# Patient Record
Sex: FEMALE | Race: OTHER | HISPANIC OR LATINO | Employment: OTHER | URBAN - METROPOLITAN AREA
[De-identification: names, ages, dates, MRNs, and addresses within clinical notes are randomized per-mention and may not be internally consistent; named-entity substitution may affect disease eponyms.]

---

## 2021-03-03 DIAGNOSIS — Z23 NEED FOR VACCINATION: ICD-10-CM

## 2023-06-15 ENCOUNTER — HOSPITAL ENCOUNTER (OUTPATIENT)
Dept: HEMATOLOGY ONCOLOGY | Facility: MEDICAL CENTER | Age: 68
End: 2023-06-15
Attending: INTERNAL MEDICINE
Payer: COMMERCIAL

## 2023-06-15 VITALS
HEIGHT: 62 IN | RESPIRATION RATE: 15 BRPM | TEMPERATURE: 98.6 F | BODY MASS INDEX: 24.87 KG/M2 | WEIGHT: 135.14 LBS | DIASTOLIC BLOOD PRESSURE: 78 MMHG | OXYGEN SATURATION: 94 % | HEART RATE: 72 BPM | SYSTOLIC BLOOD PRESSURE: 138 MMHG

## 2023-06-15 DIAGNOSIS — I10 PRIMARY HYPERTENSION: ICD-10-CM

## 2023-06-15 DIAGNOSIS — D05.12 DUCTAL CARCINOMA IN SITU (DCIS) OF LEFT BREAST: ICD-10-CM

## 2023-06-15 PROCEDURE — 99204 OFFICE O/P NEW MOD 45 MIN: CPT | Performed by: INTERNAL MEDICINE

## 2023-06-15 RX ORDER — ATENOLOL 100 MG/1
TABLET ORAL
COMMUNITY
Start: 2023-05-10 | End: 2023-08-30 | Stop reason: SDUPTHER

## 2023-06-15 RX ORDER — CHLORAL HYDRATE 500 MG
1000 CAPSULE ORAL DAILY
COMMUNITY

## 2023-06-15 RX ORDER — LETROZOLE 2.5 MG/1
TABLET, FILM COATED ORAL
COMMUNITY
Start: 2023-05-10 | End: 2023-08-14 | Stop reason: SDUPTHER

## 2023-06-15 ASSESSMENT — ENCOUNTER SYMPTOMS
MEMORY LOSS: 0
DEPRESSION: 0
TREMORS: 0
WEIGHT LOSS: 0
PALPITATIONS: 0
HEADACHES: 0
MYALGIAS: 0
DIZZINESS: 0
COUGH: 0
SPUTUM PRODUCTION: 0
NECK PAIN: 0
FOCAL WEAKNESS: 0
CHILLS: 0
WHEEZING: 0
VOMITING: 0
SHORTNESS OF BREATH: 0
FEVER: 0
BRUISES/BLEEDS EASILY: 0
SENSORY CHANGE: 0
TINGLING: 0
ORTHOPNEA: 0
BLURRED VISION: 0
NAUSEA: 0
ABDOMINAL PAIN: 0
SORE THROAT: 0
HEARTBURN: 0
BACK PAIN: 0

## 2023-06-15 NOTE — PROGRESS NOTES
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 female with a remote history of right DCIS and a more recent history of left-sided DCIS treated with partial mastectomy and radiation in 2020.  She saw Dr. Shoemaker in [place] who started her on letrozole at that time.  She is tolerating it very well with no hot flashes night sweats or musculoskeletal symptoms.  She had a recent bone density test that showed normal bone density.  Her last mammogram was in March 2022.  She has no problems with her breast now.  Other than hypertension her health is good.      History reviewed. No pertinent past medical history.    History reviewed. No pertinent surgical history.    Social History     Tobacco Use    Smoking status: Never    Smokeless tobacco: Never   Vaping Use    Vaping Use: Never used   Substance Use Topics    Alcohol use: Yes     Comment: Very seldom        History reviewed. No pertinent family history.    Allergies as of 06/15/2023 - Reviewed 06/15/2023   Allergen Reaction Noted    Hydrocodone Vomiting and Nausea 08/19/2019    Codeine Unspecified 06/15/2023    Latex Rash 06/15/2023    Septra [sulfamethoxazole w-trimethoprim] Hives 06/15/2023         Current Outpatient Medications:     letrozole (FEMARA) 2.5 MG Tab, , Disp: , Rfl:     atenolol (TENORMIN) 100 MG Tab, , Disp: , Rfl:     Omega-3 Fatty Acids (FISH OIL) 1000 MG Cap capsule, Take 1,000 mg by mouth every day., Disp: , Rfl:     cyanocobalamin (VITAMIN B12) 1000 MCG Tab, Take 1,000 mcg by mouth every day., Disp: , Rfl:     Calcium-Magnesium-Vitamin D (CALCIUM MAGNESIUM PO), Take  by mouth., Disp: , Rfl:     Multiple Vitamin (MULTIVITAMIN ADULT PO), Take  by mouth., Disp: , Rfl:     Review of Systems:  Review of Systems  "  Constitutional:  Negative for chills, fever, malaise/fatigue and weight loss.   HENT:  Negative for congestion, ear pain, nosebleeds and sore throat.    Eyes:  Negative for blurred vision.   Respiratory:  Negative for cough, sputum production, shortness of breath and wheezing.    Cardiovascular:  Negative for chest pain, palpitations, orthopnea and leg swelling.   Gastrointestinal:  Negative for abdominal pain, heartburn, nausea and vomiting.   Genitourinary:  Negative for dysuria, frequency and urgency.   Musculoskeletal:  Negative for back pain, joint pain, myalgias and neck pain.   Neurological:  Negative for dizziness, tingling, tremors, sensory change, focal weakness and headaches.   Endo/Heme/Allergies:  Does not bruise/bleed easily.   Psychiatric/Behavioral:  Negative for depression, memory loss and suicidal ideas.    All other systems reviewed and are negative.         Physical Exam:  Vitals:    06/15/23 1605   BP: 138/78   BP Location: Right arm   Patient Position: Sitting   Pulse: 72   Resp: 15   Temp: 37 °C (98.6 °F)   TempSrc: Temporal   SpO2: 94%   Weight: 61.3 kg (135 lb 2.3 oz)   Height: 1.568 m (5' 1.75\")       DESC; KARNOFSKY SCALE WITH ECOG EQUIVALENT: 100, Fully active, able to carry on all pre-disease performed without restriction (ECOG equivalent 0)    DISTRESS LEVEL: no acute distress    Physical Exam  Constitutional:       Appearance: Normal appearance.   HENT:      Head: Normocephalic.      Mouth/Throat:      Mouth: Mucous membranes are moist.      Pharynx: Oropharynx is clear.   Eyes:      Extraocular Movements: Extraocular movements intact.      Conjunctiva/sclera: Conjunctivae normal.      Pupils: Pupils are equal, round, and reactive to light.   Cardiovascular:      Rate and Rhythm: Normal rate and regular rhythm.      Pulses: Normal pulses.   Pulmonary:      Effort: Pulmonary effort is normal.      Breath sounds: Normal breath sounds.   Chest:      Comments: Both breasts are without " masses nipple discharge or tenderness.  Abdominal:      General: Abdomen is flat. Bowel sounds are normal.      Palpations: Abdomen is soft. There is no mass.   Musculoskeletal:         General: Normal range of motion.   Skin:     General: Skin is warm.   Neurological:      General: No focal deficit present.      Mental Status: She is alert and oriented to person, place, and time.   Psychiatric:         Mood and Affect: Mood normal.         Behavior: Behavior normal.              Labs:  No visits with results within 1 Month(s) from this visit.   Latest known visit with results is:   No results found for any previous visit.       Imaging:   All listed images below have been independently reviewed by me. I agree with the findings as summarized below:    No results found.     Pathology:      Assessment & Plan:  history of bilateral DCIS status postlumpectomy and radiation therapy on chemoprophylaxis with letrozole since 2020.    Plan: Baseline yearly mammogram to be ordered.  I will see her back in 6 months for routine follow-up .  We plan to continue letrozole for 2 more years.      Any questions and concerns raised by the patient were answered to the best of my ability. Thank you for allowing me to participate in the care for this patient. Please feel free to contact me for any questions or concerns.     Victorino Keys M.D.

## 2023-06-20 ENCOUNTER — TELEPHONE (OUTPATIENT)
Dept: HEALTH INFORMATION MANAGEMENT | Facility: OTHER | Age: 68
End: 2023-06-20
Payer: COMMERCIAL

## 2023-06-30 ENCOUNTER — HOSPITAL ENCOUNTER (OUTPATIENT)
Dept: RADIOLOGY | Facility: MEDICAL CENTER | Age: 68
End: 2023-06-30
Payer: COMMERCIAL

## 2023-07-03 ENCOUNTER — HOSPITAL ENCOUNTER (OUTPATIENT)
Dept: RADIOLOGY | Facility: MEDICAL CENTER | Age: 68
End: 2023-07-03
Attending: INTERNAL MEDICINE
Payer: COMMERCIAL

## 2023-07-03 DIAGNOSIS — I10 PRIMARY HYPERTENSION: ICD-10-CM

## 2023-07-03 DIAGNOSIS — D05.12 DUCTAL CARCINOMA IN SITU (DCIS) OF LEFT BREAST: ICD-10-CM

## 2023-07-03 PROCEDURE — G0279 TOMOSYNTHESIS, MAMMO: HCPCS

## 2023-07-26 ENCOUNTER — TELEPHONE (OUTPATIENT)
Dept: HEALTH INFORMATION MANAGEMENT | Facility: OTHER | Age: 68
End: 2023-07-26
Payer: COMMERCIAL

## 2023-08-14 DIAGNOSIS — D05.12 DUCTAL CARCINOMA IN SITU (DCIS) OF LEFT BREAST: ICD-10-CM

## 2023-08-14 PROCEDURE — RXMED WILLOW AMBULATORY MEDICATION CHARGE: Performed by: INTERNAL MEDICINE

## 2023-08-14 RX ORDER — LETROZOLE 2.5 MG/1
2.5 TABLET, FILM COATED ORAL DAILY
Qty: 90 TABLET | Refills: 3 | Status: SHIPPED | OUTPATIENT
Start: 2023-08-14

## 2023-08-15 ENCOUNTER — PHARMACY VISIT (OUTPATIENT)
Dept: PHARMACY | Facility: MEDICAL CENTER | Age: 68
End: 2023-08-15
Payer: COMMERCIAL

## 2023-08-30 ENCOUNTER — OFFICE VISIT (OUTPATIENT)
Dept: MEDICAL GROUP | Facility: MEDICAL CENTER | Age: 68
End: 2023-08-30
Attending: INTERNAL MEDICINE
Payer: COMMERCIAL

## 2023-08-30 VITALS
OXYGEN SATURATION: 98 % | WEIGHT: 133 LBS | TEMPERATURE: 98.3 F | BODY MASS INDEX: 25.11 KG/M2 | HEART RATE: 68 BPM | SYSTOLIC BLOOD PRESSURE: 130 MMHG | RESPIRATION RATE: 17 BRPM | DIASTOLIC BLOOD PRESSURE: 80 MMHG | HEIGHT: 61 IN

## 2023-08-30 DIAGNOSIS — Z23 NEED FOR VACCINATION: ICD-10-CM

## 2023-08-30 DIAGNOSIS — E53.8 VITAMIN B12 DEFICIENCY: ICD-10-CM

## 2023-08-30 DIAGNOSIS — C50.412 MALIGNANT NEOPLASM OF UPPER-OUTER QUADRANT OF LEFT FEMALE BREAST, UNSPECIFIED ESTROGEN RECEPTOR STATUS (HCC): ICD-10-CM

## 2023-08-30 DIAGNOSIS — R73.03 PREDIABETES: ICD-10-CM

## 2023-08-30 DIAGNOSIS — E55.9 VITAMIN D DEFICIENCY: ICD-10-CM

## 2023-08-30 DIAGNOSIS — I10 PRIMARY HYPERTENSION: Primary | ICD-10-CM

## 2023-08-30 DIAGNOSIS — E05.90 SUBCLINICAL HYPERTHYROIDISM: ICD-10-CM

## 2023-08-30 PROBLEM — M67.40 GANGLION CYST OF FLEXOR TENDON SHEATH: Status: ACTIVE | Noted: 2017-05-04

## 2023-08-30 PROBLEM — L30.9 DERMATITIS, UNSPECIFIED: Status: ACTIVE | Noted: 2019-10-07

## 2023-08-30 PROBLEM — M77.8 OTHER ENTHESOPATHIES, NOT ELSEWHERE CLASSIFIED: Status: ACTIVE | Noted: 2019-10-07

## 2023-08-30 PROBLEM — M54.16 LUMBAR RADICULOPATHY: Status: ACTIVE | Noted: 2018-04-23

## 2023-08-30 PROBLEM — H81.10 BENIGN PAROXYSMAL VERTIGO, UNSPECIFIED EAR: Status: ACTIVE | Noted: 2019-10-07

## 2023-08-30 PROCEDURE — 90677 PCV20 VACCINE IM: CPT | Performed by: INTERNAL MEDICINE

## 2023-08-30 PROCEDURE — 3075F SYST BP GE 130 - 139MM HG: CPT | Performed by: INTERNAL MEDICINE

## 2023-08-30 PROCEDURE — 90471 IMMUNIZATION ADMIN: CPT | Performed by: INTERNAL MEDICINE

## 2023-08-30 PROCEDURE — 3079F DIAST BP 80-89 MM HG: CPT | Performed by: INTERNAL MEDICINE

## 2023-08-30 PROCEDURE — 99204 OFFICE O/P NEW MOD 45 MIN: CPT | Mod: 25 | Performed by: INTERNAL MEDICINE

## 2023-08-30 PROCEDURE — RXMED WILLOW AMBULATORY MEDICATION CHARGE: Performed by: INTERNAL MEDICINE

## 2023-08-30 RX ORDER — ATENOLOL 100 MG/1
100 TABLET ORAL DAILY
Qty: 90 TABLET | Refills: 3 | Status: SHIPPED | OUTPATIENT
Start: 2023-08-30

## 2023-08-30 RX ORDER — ATENOLOL AND CHLORTHALIDONE TABLET 50; 25 MG/1; MG/1
1 TABLET ORAL DAILY
COMMUNITY
End: 2023-08-30

## 2023-08-30 ASSESSMENT — PATIENT HEALTH QUESTIONNAIRE - PHQ9: CLINICAL INTERPRETATION OF PHQ2 SCORE: 0

## 2023-08-30 NOTE — PROGRESS NOTES
Subjective:     Chief Complaint   Patient presents with    Establish Care      The primary encounter diagnosis was Primary hypertension. Diagnoses of Prediabetes, Malignant neoplasm of upper-outer quadrant of left female breast, unspecified estrogen receptor status (HCC), Vitamin D deficiency, Need for vaccination, Vitamin B12 deficiency, and Subclinical hyperthyroidism were also pertinent to this visit.    HISTORY OF THE PRESENT ILLNESS: Patient is a 68 y.o. female. This pleasant patient is here today to establish care. Her prior PCP was in [place]    Problem   Malignant Neoplasm of Upper-Outer Quadrant of Left Female Breast (Hcc)    Oncology note from 6/15/2023 reviewed.  Briefly: Patient with remote history of right DCIS and more recent history of left-sided DCIS, was treated with partial mastectomy and radiation in 2020.  She saw Dr. Shoemaker in [place], who started her on letrozole at that time.  She is tolerating treatment well without hot flashes or night sweats.  Her last mammogram was in March 2022 plan to continue treatment for 2 more years, routine mammogram yearly.     Hypertension    This is a chronic condition, controlled  Current regimen includes: Atenolol 100 mg   Previous regimens: Atenolol-HCTZ  Patient is compliant  and reports no side effects with treatment.  Diet: DASH  Exercise: regular      Subclinical Hyperthyroidism    From prior PCP note:  TSH slightly low at 0.27 (ref 0.35-5.1) but FT4 normal at 1.14.   No hyperthyroid symptoms.   No thyroid enlargement.     Prediabetes    Lab Results   Component Value Date/Time    HBA1C 6.0 (H) 11/11/2022 10:10 AM      Was diagnosed last year in November 2022, patient was advised on low-carb diet and exercise.     Microscopic Hematuria    Chart review: Description: Benign urology workup per Dr. Mcgee in 2015       Past Medical History:   Diagnosis Date    DCIS (ductal carcinoma in situ)     Hypertension     Prediabetes      Past Surgical History:  "  Procedure Laterality Date    MASTECTOMY Bilateral     partial, last Sx in 2019, has Radiation 1 mo    OTHER      nasal septum correction    OTHER      ablation uterus    PRIMARY C SECTION      TUBE & ECTOPIC PREG., REMOVAL       Family History   Problem Relation Age of Onset    Hyperlipidemia Mother     Depression Mother     Hyperlipidemia Father     Hypertension Father     Diabetes Maternal Uncle     Cancer Maternal Uncle         x3 passed away 3 years apart    Breast Cancer Paternal Aunt     Cancer Paternal Aunt      Social History     Tobacco Use    Smoking status: Never    Smokeless tobacco: Never   Vaping Use    Vaping Use: Never used   Substance Use Topics    Alcohol use: Yes     Comment: Very seldom    Drug use: Never     Current Outpatient Medications Ordered in Epic   Medication Sig Dispense Refill    atenolol (TENORMIN) 100 MG Tab Take 1 Tablet by mouth every day. 90 Tablet 3    letrozole (FEMARA) 2.5 MG Tab Take 1 Tablet by mouth every day. 90 Tablet 3    Omega-3 Fatty Acids (FISH OIL) 1000 MG Cap capsule Take 1,000 mg by mouth every day.      cyanocobalamin (VITAMIN B12) 1000 MCG Tab Take 1,000 mcg by mouth every day.      Multiple Vitamin (MULTIVITAMIN ADULT PO) Take  by mouth.      CALCIUM PO Take  by mouth. (Patient not taking: Reported on 8/30/2023)      Calcium-Magnesium-Vitamin D (CALCIUM MAGNESIUM PO) Take  by mouth. (Patient not taking: Reported on 8/30/2023)       No current [facility]-ordered facility-administered medications on file.     Review of Systems   All other systems reviewed and are negative.    Objective:     Exam: /80 (BP Location: Left arm, Patient Position: Sitting, BP Cuff Size: Adult)   Pulse 68   Temp 36.8 °C (98.3 °F) (Temporal)   Resp 17   Ht 1.549 m (5' 1\")   Wt 60.3 kg (133 lb)   SpO2 98%  Body mass index is 25.13 kg/m².    Physical Exam  Constitutional:       Appearance: Normal appearance.   HENT:      Head: Normocephalic and atraumatic.      Nose: Nose normal. No " congestion.      Mouth/Throat:      Mouth: Mucous membranes are moist.      Pharynx: No oropharyngeal exudate.   Cardiovascular:      Rate and Rhythm: Normal rate and regular rhythm.      Pulses: Normal pulses.      Heart sounds: Normal heart sounds. No murmur heard.  Pulmonary:      Effort: Pulmonary effort is normal. No respiratory distress.      Breath sounds: Normal breath sounds.   Neurological:      Mental Status: She is alert and oriented to person, place, and time.   Psychiatric:         Mood and Affect: Mood normal.       Labs: Reviewed TSH, lipid panel, A1c, CBC and CMP from 11/11/2022    Assessment & Plan:   68 y.o. female with the following -    Problem List Items Addressed This Visit       Hypertension - Primary (Chronic)     This is a chronic problem, stable and controlled on current regimen.  Continue without changes.         Relevant Medications    atenolol (TENORMIN) 100 MG Tab    Other Relevant Orders    Lipid Profile    CBC WITH DIFFERENTIAL    Comp Metabolic Panel    TSH WITH REFLEX TO FT4    Malignant neoplasm of upper-outer quadrant of left female breast (HCC)    Prediabetes     Healthful lifestyle measures, repeat A1c         Relevant Orders    HEMOGLOBIN A1C    Subclinical hyperthyroidism     Repeat thyroid levels.            Other Visit Diagnoses       Vitamin D deficiency        Relevant Orders    VITAMIN D,25 HYDROXY (DEFICIENCY)    Need for vaccination        Relevant Orders    Pneumococcal Conjugate Vaccine 20-Valent (19 yrs+)    Vitamin B12 deficiency        Relevant Orders    VITAMIN B12            Return in about 3 months (around 11/30/2023) for labs follow up .    Please note that this dictation was created using voice recognition software. I have made every reasonable attempt to correct obvious errors, but I expect that there are errors of grammar and possibly content that I did not discover before finalizing the note.

## 2023-09-06 ENCOUNTER — PHARMACY VISIT (OUTPATIENT)
Dept: PHARMACY | Facility: MEDICAL CENTER | Age: 68
End: 2023-09-06
Payer: COMMERCIAL

## 2023-09-11 PROCEDURE — RXMED WILLOW AMBULATORY MEDICATION CHARGE: Performed by: INTERNAL MEDICINE

## 2023-09-13 ENCOUNTER — PHARMACY VISIT (OUTPATIENT)
Dept: PHARMACY | Facility: MEDICAL CENTER | Age: 68
End: 2023-09-13
Payer: COMMERCIAL

## 2023-09-13 PROCEDURE — RXMED WILLOW AMBULATORY MEDICATION CHARGE: Performed by: INTERNAL MEDICINE

## 2023-09-20 ENCOUNTER — PHARMACY VISIT (OUTPATIENT)
Dept: PHARMACY | Facility: MEDICAL CENTER | Age: 68
End: 2023-09-20
Payer: COMMERCIAL

## 2023-09-26 ENCOUNTER — PHARMACY VISIT (OUTPATIENT)
Dept: PHARMACY | Facility: MEDICAL CENTER | Age: 68
End: 2023-09-26
Payer: COMMERCIAL

## 2023-09-26 PROCEDURE — RXMED WILLOW AMBULATORY MEDICATION CHARGE: Performed by: INTERNAL MEDICINE

## 2023-09-26 RX ORDER — INFLUENZA A VIRUS A/BRISBANE/02/2018 IVR-190 (H1N1) ANTIGEN (FORMALDEHYDE INACTIVATED), INFLUENZA A VIRUS A/KANSAS/14/2017 X-327 (H3N2) ANTIGEN (FORMALDEHYDE INACTIVATED), INFLUENZA B VIRUS B/PHUKET/3073/2013 ANTIGEN (FORMALDEHYDE INACTIVATED), AND INFLUENZA B VIRUS B/MARYLAND/15/2016 BX-69A ANTIGEN (FORMALDEHYDE INACTIVATED) 15; 15; 15; 15 UG/.5ML; UG/.5ML; UG/.5ML; UG/.5ML
INJECTION, SUSPENSION INTRAMUSCULAR
Qty: 0.5 ML | Refills: 0 | Status: SHIPPED | OUTPATIENT
Start: 2023-09-26 | End: 2024-02-15

## 2023-09-29 ENCOUNTER — HOSPITAL ENCOUNTER (OUTPATIENT)
Dept: LAB | Facility: MEDICAL CENTER | Age: 68
End: 2023-09-29
Attending: INTERNAL MEDICINE
Payer: COMMERCIAL

## 2023-09-29 DIAGNOSIS — I10 PRIMARY HYPERTENSION: ICD-10-CM

## 2023-09-29 DIAGNOSIS — E53.8 VITAMIN B12 DEFICIENCY: ICD-10-CM

## 2023-09-29 DIAGNOSIS — E55.9 VITAMIN D DEFICIENCY: ICD-10-CM

## 2023-09-29 DIAGNOSIS — R73.03 PREDIABETES: ICD-10-CM

## 2023-09-29 LAB
25(OH)D3 SERPL-MCNC: 36 NG/ML (ref 30–100)
ALBUMIN SERPL BCP-MCNC: 4.6 G/DL (ref 3.2–4.9)
ALBUMIN/GLOB SERPL: 1.3 G/DL
ALP SERPL-CCNC: 117 U/L (ref 30–99)
ALT SERPL-CCNC: 10 U/L (ref 2–50)
ANION GAP SERPL CALC-SCNC: 14 MMOL/L (ref 7–16)
AST SERPL-CCNC: 18 U/L (ref 12–45)
BASOPHILS # BLD AUTO: 0.6 % (ref 0–1.8)
BASOPHILS # BLD: 0.04 K/UL (ref 0–0.12)
BILIRUB SERPL-MCNC: 0.5 MG/DL (ref 0.1–1.5)
BUN SERPL-MCNC: 19 MG/DL (ref 8–22)
CALCIUM ALBUM COR SERPL-MCNC: 9.4 MG/DL (ref 8.5–10.5)
CALCIUM SERPL-MCNC: 9.9 MG/DL (ref 8.5–10.5)
CHLORIDE SERPL-SCNC: 103 MMOL/L (ref 96–112)
CHOLEST SERPL-MCNC: 240 MG/DL (ref 100–199)
CO2 SERPL-SCNC: 25 MMOL/L (ref 20–33)
CREAT SERPL-MCNC: 0.93 MG/DL (ref 0.5–1.4)
EOSINOPHIL # BLD AUTO: 0.13 K/UL (ref 0–0.51)
EOSINOPHIL NFR BLD: 2.1 % (ref 0–6.9)
ERYTHROCYTE [DISTWIDTH] IN BLOOD BY AUTOMATED COUNT: 43.3 FL (ref 35.9–50)
EST. AVERAGE GLUCOSE BLD GHB EST-MCNC: 120 MG/DL
GFR SERPLBLD CREATININE-BSD FMLA CKD-EPI: 67 ML/MIN/1.73 M 2
GLOBULIN SER CALC-MCNC: 3.5 G/DL (ref 1.9–3.5)
GLUCOSE SERPL-MCNC: 107 MG/DL (ref 65–99)
HBA1C MFR BLD: 5.8 % (ref 4–5.6)
HCT VFR BLD AUTO: 41.6 % (ref 37–47)
HDLC SERPL-MCNC: 41 MG/DL
HGB BLD-MCNC: 13.4 G/DL (ref 12–16)
IMM GRANULOCYTES # BLD AUTO: 0.02 K/UL (ref 0–0.11)
IMM GRANULOCYTES NFR BLD AUTO: 0.3 % (ref 0–0.9)
LDLC SERPL CALC-MCNC: 157 MG/DL
LYMPHOCYTES # BLD AUTO: 2.8 K/UL (ref 1–4.8)
LYMPHOCYTES NFR BLD: 45.2 % (ref 22–41)
MCH RBC QN AUTO: 28.2 PG (ref 27–33)
MCHC RBC AUTO-ENTMCNC: 32.2 G/DL (ref 32.2–35.5)
MCV RBC AUTO: 87.6 FL (ref 81.4–97.8)
MONOCYTES # BLD AUTO: 0.37 K/UL (ref 0–0.85)
MONOCYTES NFR BLD AUTO: 6 % (ref 0–13.4)
NEUTROPHILS # BLD AUTO: 2.84 K/UL (ref 1.82–7.42)
NEUTROPHILS NFR BLD: 45.8 % (ref 44–72)
NRBC # BLD AUTO: 0 K/UL
NRBC BLD-RTO: 0 /100 WBC (ref 0–0.2)
PLATELET # BLD AUTO: 312 K/UL (ref 164–446)
PMV BLD AUTO: 12.1 FL (ref 9–12.9)
POTASSIUM SERPL-SCNC: 4 MMOL/L (ref 3.6–5.5)
PROT SERPL-MCNC: 8.1 G/DL (ref 6–8.2)
RBC # BLD AUTO: 4.75 M/UL (ref 4.2–5.4)
SODIUM SERPL-SCNC: 142 MMOL/L (ref 135–145)
T4 FREE SERPL-MCNC: 1.38 NG/DL (ref 0.93–1.7)
TRIGL SERPL-MCNC: 211 MG/DL (ref 0–149)
TSH SERPL DL<=0.005 MIU/L-ACNC: 0.24 UIU/ML (ref 0.38–5.33)
VIT B12 SERPL-MCNC: 1490 PG/ML (ref 211–911)
WBC # BLD AUTO: 6.2 K/UL (ref 4.8–10.8)

## 2023-09-29 PROCEDURE — 84443 ASSAY THYROID STIM HORMONE: CPT

## 2023-09-29 PROCEDURE — 83036 HEMOGLOBIN GLYCOSYLATED A1C: CPT

## 2023-09-29 PROCEDURE — 84439 ASSAY OF FREE THYROXINE: CPT

## 2023-09-29 PROCEDURE — 80053 COMPREHEN METABOLIC PANEL: CPT

## 2023-09-29 PROCEDURE — 36415 COLL VENOUS BLD VENIPUNCTURE: CPT

## 2023-09-29 PROCEDURE — 82607 VITAMIN B-12: CPT

## 2023-09-29 PROCEDURE — 80061 LIPID PANEL: CPT

## 2023-09-29 PROCEDURE — 82306 VITAMIN D 25 HYDROXY: CPT

## 2023-09-29 PROCEDURE — 85025 COMPLETE CBC W/AUTO DIFF WBC: CPT

## 2023-10-03 ENCOUNTER — TELEPHONE (OUTPATIENT)
Dept: MEDICAL GROUP | Facility: MEDICAL CENTER | Age: 68
End: 2023-10-03
Payer: COMMERCIAL

## 2023-10-03 NOTE — TELEPHONE ENCOUNTER
Pt contacted office to return call regarding results. [place] and it is difficult to use her phone. Informed pt of provider note. Pt verbalized understanding  and had no further questions.

## 2023-12-12 ENCOUNTER — PHARMACY VISIT (OUTPATIENT)
Dept: PHARMACY | Facility: MEDICAL CENTER | Age: 68
End: 2023-12-12
Payer: COMMERCIAL

## 2023-12-12 PROCEDURE — RXMED WILLOW AMBULATORY MEDICATION CHARGE: Performed by: INTERNAL MEDICINE

## 2023-12-14 ENCOUNTER — HOSPITAL ENCOUNTER (OUTPATIENT)
Dept: HEMATOLOGY ONCOLOGY | Facility: MEDICAL CENTER | Age: 68
End: 2023-12-14
Attending: INTERNAL MEDICINE
Payer: COMMERCIAL

## 2023-12-14 VITALS
HEART RATE: 62 BPM | BODY MASS INDEX: 23.47 KG/M2 | DIASTOLIC BLOOD PRESSURE: 76 MMHG | RESPIRATION RATE: 13 BRPM | TEMPERATURE: 98 F | SYSTOLIC BLOOD PRESSURE: 138 MMHG | HEIGHT: 62 IN | WEIGHT: 127.54 LBS | OXYGEN SATURATION: 97 %

## 2023-12-14 DIAGNOSIS — D05.12 DUCTAL CARCINOMA IN SITU (DCIS) OF LEFT BREAST: ICD-10-CM

## 2023-12-14 PROCEDURE — 99213 OFFICE O/P EST LOW 20 MIN: CPT | Performed by: INTERNAL MEDICINE

## 2023-12-14 PROCEDURE — 99212 OFFICE O/P EST SF 10 MIN: CPT | Performed by: INTERNAL MEDICINE

## 2023-12-14 ASSESSMENT — ENCOUNTER SYMPTOMS
HEARTBURN: 0
MEMORY LOSS: 0
BLURRED VISION: 0
TINGLING: 0
SORE THROAT: 0
SPUTUM PRODUCTION: 0
NECK PAIN: 0
WEIGHT LOSS: 0
ABDOMINAL PAIN: 0
SHORTNESS OF BREATH: 0
ORTHOPNEA: 0
BACK PAIN: 0
WHEEZING: 0
FEVER: 0
VOMITING: 0
TREMORS: 0
CHILLS: 0
NAUSEA: 0
SENSORY CHANGE: 0
MYALGIAS: 0
COUGH: 0
FOCAL WEAKNESS: 0
PALPITATIONS: 0
DEPRESSION: 0
DIZZINESS: 0
BRUISES/BLEEDS EASILY: 0
HEADACHES: 0

## 2023-12-14 ASSESSMENT — FIBROSIS 4 INDEX: FIB4 SCORE: 1.22

## 2023-12-14 ASSESSMENT — PAIN SCALES - GENERAL: PAINLEVEL: NO PAIN

## 2023-12-14 NOTE — ADDENDUM NOTE
Encounter addended by: Ramo Jaime, Med Ass't on: 12/14/2023 12:04 PM   Actions taken: Charge Capture section accepted

## 2023-12-14 NOTE — PROGRESS NOTES
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 female with a remote history of right DCIS and a more recent history of left-sided DCIS treated with partial mastectomy and radiation in 2020.  She saw Dr. Shoemaker in [place] who started her on letrozole at that time.  She is tolerating it very well with no hot flashes night sweats or musculoskeletal symptoms.  She had a recent bone density test that showed normal bone density.  Her last mammogram was in March 2022.  She has no problems with her breast now.  Other than hypertension her health is good.    Interval history 12/14/2023: She has done very well over the past 6 months.  Routine mammogram screening in July 2023 was normal.  She tolerates letrozole without any difficulty whatsoever.  No new medical problems.      Past Medical History:   Diagnosis Date    DCIS (ductal carcinoma in situ)     Hypertension     Prediabetes        Past Surgical History:   Procedure Laterality Date    MASTECTOMY Bilateral     partial, last Sx in 2019, has Radiation 1 mo    OTHER      nasal septum correction    OTHER      ablation uterus    PRIMARY C SECTION      TUBE & ECTOPIC PREG., REMOVAL         Social History     Tobacco Use    Smoking status: Never    Smokeless tobacco: Never   Vaping Use    Vaping Use: Never used   Substance Use Topics    Alcohol use: Yes     Comment: Very seldom    Drug use: Never        Family History   Problem Relation Age of Onset    Hyperlipidemia Mother     Depression Mother     Hyperlipidemia Father     Hypertension Father     Diabetes Maternal Uncle     Cancer Maternal Uncle         x3 passed away 3 years apart    Breast Cancer Paternal Aunt     Cancer Paternal Aunt        Allergies as of 12/14/2023 - Reviewed  12/14/2023   Allergen Reaction Noted    Sulfa drugs Hives and Rash 08/19/2019    Hydrocodone Vomiting and Nausea 08/19/2019    Cephalexin  12/05/2022    Codeine Unspecified 06/15/2023    Hydrocodone-acetaminophen Vomiting 12/05/2022    Septra [sulfamethoxazole w-trimethoprim] Hives 06/15/2023    Latex Rash 07/30/2015         Current Outpatient Medications:     Misc Natural Products (OSTEO BI-FLEX TRIPLE STRENGTH PO), Take  by mouth., Disp: , Rfl:     CALCIUM PO, Take  by mouth., Disp: , Rfl:     atenolol (TENORMIN) 100 MG Tab, Take 1 Tablet by mouth every day., Disp: 90 Tablet, Rfl: 3    letrozole (FEMARA) 2.5 MG Tab, Take 1 Tablet by mouth every day., Disp: 90 Tablet, Rfl: 3    Omega-3 Fatty Acids (FISH OIL) 1000 MG Cap capsule, Take 1,000 mg by mouth every day., Disp: , Rfl:     cyanocobalamin (VITAMIN B12) 1000 MCG Tab, Take 1,000 mcg by mouth every day., Disp: , Rfl:     influenza vaccine quad (FLUZONE QUADRIVALENT) 0.5 ML Suspension Prefilled Syringe injection, Inject 0.5ml into the shoulder, thigh, or buttocks., Disp: 0.5 mL, Rfl: 0    Calcium-Magnesium-Vitamin D (CALCIUM MAGNESIUM PO), Take  by mouth. (Patient not taking: Reported on 12/14/2023), Disp: , Rfl:     Multiple Vitamin (MULTIVITAMIN ADULT PO), Take  by mouth. (Patient not taking: Reported on 12/14/2023), Disp: , Rfl:     Review of Systems:  Review of Systems   Constitutional:  Negative for chills, fever, malaise/fatigue and weight loss.   HENT:  Negative for congestion, ear pain, nosebleeds and sore throat.    Eyes:  Negative for blurred vision.   Respiratory:  Negative for cough, sputum production, shortness of breath and wheezing.    Cardiovascular:  Negative for chest pain, palpitations, orthopnea and leg swelling.   Gastrointestinal:  Negative for abdominal pain, heartburn, nausea and vomiting.   Genitourinary:  Negative for dysuria, frequency and urgency.   Musculoskeletal:  Negative for back pain, joint pain, myalgias and neck pain.  "  Neurological:  Negative for dizziness, tingling, tremors, sensory change, focal weakness and headaches.   Endo/Heme/Allergies:  Does not bruise/bleed easily.   Psychiatric/Behavioral:  Negative for depression, memory loss and suicidal ideas.    All other systems reviewed and are negative.         Physical Exam:  Vitals:    12/14/23 1030   BP: 138/76   BP Location: Right arm   Patient Position: Sitting   Pulse: 62   Resp: 13   Temp: 36.7 °C (98 °F)   TempSrc: Temporal   SpO2: 97%   Weight: 57.9 kg (127 lb 8.6 oz)   Height: 1.568 m (5' 1.75\")       DESC; KARNOFSKY SCALE WITH ECOG EQUIVALENT: 100, Fully active, able to carry on all pre-disease performed without restriction (ECOG equivalent 0)    DISTRESS LEVEL: no acute distress    Physical Exam  Constitutional:       Appearance: Normal appearance.   HENT:      Head: Normocephalic.      Mouth/Throat:      Mouth: Mucous membranes are moist.      Pharynx: Oropharynx is clear.   Eyes:      Extraocular Movements: Extraocular movements intact.      Conjunctiva/sclera: Conjunctivae normal.      Pupils: Pupils are equal, round, and reactive to light.   Cardiovascular:      Rate and Rhythm: Normal rate and regular rhythm.      Pulses: Normal pulses.   Pulmonary:      Effort: Pulmonary effort is normal.      Breath sounds: Normal breath sounds.   Chest:      Comments: Both breasts are without masses nipple discharge or tenderness.  Abdominal:      General: Abdomen is flat. Bowel sounds are normal.      Palpations: Abdomen is soft. There is no mass.   Musculoskeletal:         General: Normal range of motion.   Skin:     General: Skin is warm.   Neurological:      General: No focal deficit present.      Mental Status: She is alert and oriented to person, place, and time.   Psychiatric:         Mood and Affect: Mood normal.         Behavior: Behavior normal.              Labs:  No visits with results within 1 Month(s) from this visit.   Latest known visit with results is:   No " results found for any previous visit.       Imaging:   All listed images below have been independently reviewed by me. I agree with the findings as summarized below:    No results found.     Pathology:      Assessment & Plan:  history of bilateral DCIS status postlumpectomy and radiation therapy on chemoprophylaxis with letrozole since 2020.    Plan:  I will see her back in 6 months for routine follow-up .  She will require mammogram again in July 2024    Any questions and concerns raised by the patient were answered to the best of my ability. Thank you for allowing me to participate in the care for this patient. Please feel free to contact me for any questions or concerns.     Victorino Keys M.D.

## 2023-12-21 ENCOUNTER — OFFICE VISIT (OUTPATIENT)
Dept: MEDICAL GROUP | Facility: MEDICAL CENTER | Age: 68
End: 2023-12-21
Payer: COMMERCIAL

## 2023-12-21 VITALS
HEIGHT: 61 IN | DIASTOLIC BLOOD PRESSURE: 78 MMHG | SYSTOLIC BLOOD PRESSURE: 124 MMHG | WEIGHT: 127 LBS | TEMPERATURE: 97.5 F | OXYGEN SATURATION: 98 % | BODY MASS INDEX: 23.98 KG/M2 | HEART RATE: 66 BPM

## 2023-12-21 DIAGNOSIS — I10 PRIMARY HYPERTENSION: Chronic | ICD-10-CM

## 2023-12-21 DIAGNOSIS — M54.31 SCIATICA, RIGHT SIDE: ICD-10-CM

## 2023-12-21 DIAGNOSIS — R73.03 PREDIABETES: ICD-10-CM

## 2023-12-21 DIAGNOSIS — M25.511 CHRONIC RIGHT SHOULDER PAIN: ICD-10-CM

## 2023-12-21 DIAGNOSIS — E05.90 SUBCLINICAL HYPERTHYROIDISM: ICD-10-CM

## 2023-12-21 DIAGNOSIS — E78.5 HYPERLIPIDEMIA, UNSPECIFIED HYPERLIPIDEMIA TYPE: ICD-10-CM

## 2023-12-21 DIAGNOSIS — G89.29 CHRONIC RIGHT SHOULDER PAIN: ICD-10-CM

## 2023-12-21 PROCEDURE — 99214 OFFICE O/P EST MOD 30 MIN: CPT | Performed by: INTERNAL MEDICINE

## 2023-12-21 PROCEDURE — 3074F SYST BP LT 130 MM HG: CPT | Performed by: INTERNAL MEDICINE

## 2023-12-21 PROCEDURE — 3078F DIAST BP <80 MM HG: CPT | Performed by: INTERNAL MEDICINE

## 2023-12-21 ASSESSMENT — FIBROSIS 4 INDEX: FIB4 SCORE: 1.22

## 2023-12-21 NOTE — PROGRESS NOTES
Subjective:     CC:   Chief Complaint   Patient presents with    Shoulder Pain    Hip Pain    Conjunctivitis    Leg Pain     Diagnoses of Prediabetes, Subclinical hyperthyroidism, Chronic right shoulder pain, Sciatica, right side, Hyperlipidemia, unspecified hyperlipidemia type, and Primary hypertension were pertinent to this visit.    HPI: Leslie is a pleasant 68 y.o. female who presents today for labs follow-up and to discuss the following problems:    Problem   Chronic Right Shoulder Pain    Chronic problem, started in September 2023.  Her pain is intermittent and she is also experiencing tenderness of the deltoid muscle.  She believes she might of done something wrong in the gym, however there was no clear trauma.    She does not take Tylenol or ibuprofen, she is icing her shoulder after gym sessions.    Empty can test is negative, there is tenderness to palpation of the deltoid muscle.    Will obtain x-ray to evaluate for arthropathy, refer to physical therapy.  Continue icing/NSAIDs/gentle stretching.       Sciatica, Right Side    Ongoing problem, patient is experiencing right-sided lower back discomfort, radiating to her thigh.    Open to physical therapy.      Subclinical Hyperthyroidism    From prior PCP note:  TSH slightly low at 0.27 (ref 0.35-5.1) but FT4 normal at 1.14.   No hyperthyroid symptoms.   No thyroid enlargement.    Refer to endocrinology, obtain ultrasound of the thyroid.         Prediabetes    Lab Results   Component Value Date/Time    HBA1C 6.0 (H) 11/11/2022 10:10 AM      Lab Results   Component Value Date/Time    HBA1C 5.8 (H) 09/29/2023 11:00 AM        Was diagnosed last year in November 2022, patient is on low-carb diet, exercising regularly      Hyperlipidemia    Chronic problem, patient has been managing with healthful lifestyle measures, taking fish oil and exercising regularly    The 10-year ASCVD risk score (Vi UGALDE, et al., 2019) is: 10.3%    Statins are indicated, patient  "declines and would like to managed with lifestyle measures for the next 6 months and reassess.             Past Medical History:   Diagnosis Date    DCIS (ductal carcinoma in situ)     Hypertension     Prediabetes        Current Outpatient Medications Ordered in Epic   Medication Sig Dispense Refill    [facility] Natural Products (OSTEO BI-FLEX TRIPLE STRENGTH PO) Take  by mouth.      influenza vaccine quad (FLUZONE QUADRIVALENT) 0.5 ML Suspension Prefilled Syringe injection Inject 0.5ml into the shoulder, thigh, or buttocks. 0.5 mL 0    CALCIUM PO Take  by mouth.      atenolol (TENORMIN) 100 MG Tab Take 1 Tablet by mouth every day. 90 Tablet 3    letrozole (FEMARA) 2.5 MG Tab Take 1 Tablet by mouth every day. 90 Tablet 3    Omega-3 Fatty Acids (FISH OIL) 1000 MG Cap capsule Take 1,000 mg by mouth every day.      cyanocobalamin (VITAMIN B12) 1000 MCG Tab Take 1,000 mcg by mouth every day.      Calcium-Magnesium-Vitamin D (CALCIUM MAGNESIUM PO) Take  by mouth. (Patient not taking: Reported on 12/14/2023)      Multiple Vitamin (MULTIVITAMIN ADULT PO) Take  by mouth. (Patient not taking: Reported on 12/14/2023)       No current [facility]-ordered facility-administered medications on file.     Review of Systems   Musculoskeletal:  Positive for joint pain.   All other systems reviewed and are negative.    Objective:     Exam:  /78 (BP Location: Left arm, Patient Position: Sitting, BP Cuff Size: Adult)   Pulse 66   Temp 36.4 °C (97.5 °F) (Temporal)   Ht 1.549 m (5' 1\")   Wt 57.6 kg (127 lb)   SpO2 98%   BMI 24.00 kg/m²  Body mass index is 24 kg/m².    Physical Exam  HENT:      Head: Normocephalic and atraumatic.   Musculoskeletal:      Right shoulder: Tenderness present. Decreased range of motion.      Left shoulder: Normal.      Comments: Negative empty can test   Neurological:      Mental Status: She is alert.   Psychiatric:         Mood and Affect: Mood normal.       Labs: Reviewed and discussed CBC, CMP, TSH, vitamin " D from 9/29/2023    Assessment & Plan:   Leslie  is a pleasant 68 y.o. female with the following -     Problem List Items Addressed This Visit       Hyperlipidemia (Chronic)     Repeat lipid panel in 6 months, healthful lifestyle measures         Hypertension (Chronic)    Relevant Orders    Lipid Profile    Prediabetes (Chronic)    Chronic right shoulder pain    Relevant Orders    DX-SHOULDER 2+ RIGHT    Referral to Physical Therapy    Sciatica, right side    Relevant Orders    Referral to Physical Therapy    Subclinical hyperthyroidism    Relevant Orders    US-THYROID    Referral to Endocrinology       Return in about 6 months (around 6/21/2024), or if symptoms worsen or fail to improve, for lipid .    Please note that this dictation was created using voice recognition software. I have made every reasonable attempt to correct obvious errors, but I expect that there are errors of grammar and possibly content that I did not discover before finalizing the note.

## 2024-01-05 ENCOUNTER — PHYSICAL THERAPY (OUTPATIENT)
Dept: PHYSICAL THERAPY | Facility: REHABILITATION | Age: 69
End: 2024-01-05
Attending: INTERNAL MEDICINE
Payer: COMMERCIAL

## 2024-01-05 DIAGNOSIS — M25.511 CHRONIC RIGHT SHOULDER PAIN: ICD-10-CM

## 2024-01-05 DIAGNOSIS — G89.29 CHRONIC RIGHT SHOULDER PAIN: ICD-10-CM

## 2024-01-05 DIAGNOSIS — M54.31 SCIATICA, RIGHT SIDE: ICD-10-CM

## 2024-01-05 PROCEDURE — 97161 PT EVAL LOW COMPLEX 20 MIN: CPT

## 2024-01-05 PROCEDURE — 97110 THERAPEUTIC EXERCISES: CPT

## 2024-01-05 ASSESSMENT — ENCOUNTER SYMPTOMS: PAIN SCALE: 5

## 2024-01-05 NOTE — OP THERAPY EVALUATION
Outpatient Physical Therapy  INITIAL EVALUATION    [facility] Therapy [address]  [address]  [place] [address]  Phone:  [phone]  Fax:  [phone]    Date of Evaluation: 2024    Patient: Leslie Tamez  YOB: 1955  MRN: [id number]     Referring Provider: Lizbeth Yost M.D.  [address]  [address]  [place] [address]   Referring Diagnosis Chronic right shoulder pain [M25.511, G89.29];Sciatica, right side [M54.31]     Time Calculation  Start time: 1105  Stop time: 1150 Time Calculation (min): 45 minutes         Chief Complaint: No chief complaint on file.    Visit Diagnoses     ICD-10-CM   1. Sciatica, right side  M54.31   2. Chronic right shoulder pain  M25.511    G89.29       Date of onset of impairment: 3/11/2023    Subjective   History of Present Illness:     History of chief complaint:  Patient reports 11 months onset of LBP/sciatica with  R shoulder pain that started while at gym in .  Patient reports slight decrease in lbp with slight improvement past few months.    Prior level of function:  Retired    Pain:     Current pain ratin    Location:  R lbp, R glut, lateral  leg and n/t to feet with pain to lateral lower leg above ankle,  R shoulder ache wiuth soreness lateral humerus above elbove elbove    Aggravating factors:  Sit for more than 1' 5w/o pain upon standing  Standing hip ext equipment at gym  Lift R leg w/o pain  Carry case of water w/o pain      Shoulder:  Reach for something while laying down  Reach into cupboard w/o pain  Bench press tara or fly maching increases pain  Push-up s> 2 w/o pain      Pain comments:  Stand/walk        Past Medical History:   Diagnosis Date    DCIS (ductal carcinoma in situ)     Hypertension     Prediabetes      Past Surgical History:   Procedure Laterality Date    MASTECTOMY Bilateral     partial, last Sx in 2019, has Radiation 1 mo    OTHER      nasal septum correction    OTHER   "    ablation uterus    PRIMARY C SECTION      TUBE & ECTOPIC PREG., REMOVAL         Precautions:       Objective      Therapeutic Treatments and Modalities:     Therapeutic Treatment and Modalities Summary: SEILx5',  then REIL x 10//centralized, no leg or buttock pain  Demetria  W/  And w/o wall assist// centralized more  - instructed centralization  Vs peripheralization and importance of abolishing leg  -instructed sit/stand \"tall\"  -eldoa progression against wall with katherine slide--hep  -ball bridge for HEP       Time-based treatments/modalities:    Physical Therapy Timed Treatment Charges  Therapeutic exercise minutes (CPT 79127): 20 minutes      Assessment, Response and Plan:   Assessment details:  Patient presents with L/S derangement  syndrome with  poor posture  and poor body awareness with decreased lordosis.   Neurological exam is WNL except noted weakness of R L4 myotome with diminished R s1 DTR. Patient presents with axial load and flexion sensitivities. Patient centralized with Negrita extension protocol. Patient should do well if compliant with POC.   Prognosis: good    Goals:   Short Term Goals:   Sit for more than 15' w/o pain upon standing  Lift R leg w/o pain  RMQ<3    Shoulder:  Reach for something while laying down w/o pain  Reach into cupboard w/o pain    Push-ups > 2 w/o pain  DASH < 10%    Short term goal time span:  2-4 weeks      Long Term Goals:    Sit for more than 45' w/o pain upon standing  Standing hip ext equipment at gym w/o pain  Carry case of water w/o pain  RMQ<2    Shoulder:    Bench press tara or fly maching increases pain w/o pain  Push-ups > 5 w/o pain  DASH < 5%    Long term goal time span:  6-8 weeks    Plan:   Therapy options:  Physical therapy treatment to continue  Planned therapy interventions:  E Stim Unattended (CPT 26658), Manual Therapy (CPT 02540), Therapeutic Exercise (CPT 37657) and Mechanical Traction (CPT 71551)  Other planned therapy interventions:  Dry " needle  Frequency:  2x week  Duration in weeks:  8  Duration in visits:  16  Plan details:  Core stab, scap stab, IAStM, cupping, DN, tx, e-stim      Functional Assessment Used  PT Functional Assessment Tool Used: dash 13%, RMQ 3/24     Referring provider co-signature:  I have reviewed this plan of care and my co-signature certifies the need for services.    Certification Period: 01/05/2024 to  03/08/24    Physician Signature: ________________________________ Date: ______________

## 2024-01-08 PROCEDURE — RXMED WILLOW AMBULATORY MEDICATION CHARGE: Performed by: INTERNAL MEDICINE

## 2024-01-09 ENCOUNTER — PHARMACY VISIT (OUTPATIENT)
Dept: PHARMACY | Facility: MEDICAL CENTER | Age: 69
End: 2024-01-09
Payer: COMMERCIAL

## 2024-01-12 ENCOUNTER — PHYSICAL THERAPY (OUTPATIENT)
Dept: PHYSICAL THERAPY | Facility: REHABILITATION | Age: 69
End: 2024-01-12
Attending: INTERNAL MEDICINE
Payer: COMMERCIAL

## 2024-01-12 DIAGNOSIS — M54.31 SCIATICA, RIGHT SIDE: ICD-10-CM

## 2024-01-12 PROCEDURE — 97014 ELECTRIC STIMULATION THERAPY: CPT

## 2024-01-12 PROCEDURE — 97110 THERAPEUTIC EXERCISES: CPT

## 2024-01-12 NOTE — OP THERAPY DAILY TREATMENT
"  Outpatient Physical Therapy  DAILY TREATMENT     [facility] Physical Therapy [address]  [address]  [place] [address]  Phone:  [phone]  Fax:  [phone]    Date: 01/12/2024    Patient: Leslie Tamez  YOB: 1955  MRN: 4' late        Chief Complaint: No chief complaint on file.    Visit #: 2    SUBJECTIVE:  Much better able to climb stairs with less pain    OBJECTIVE:            Therapeutic Treatments and Modalities:     Therapeutic Treatment and Modalities Summary: SEIL--> reil--less  Reil x 10// denied pain   Reil with PT o/p  Reil with RSB and ext  Demetria  W/  And w/o wall assist// centralized more  Ball bridge 2'20\"  Ball wall squats  Reviewed centralization  Mozambican 10/10 ball roll with Maltese to  to l/s x 15' mhp         Time-based treatments/modalities:    Physical Therapy Timed Treatment Charges  Therapeutic exercise minutes (CPT 14025): 25 minutes      Pain rating (1-10) before treatment:  1--R front thigh  Pain rating (1-10) after treatment:  0    ASSESSMENT:   Abolished pain with ext protocol, poor hip dissociation, poor body awareness. Improving core endurance  PLAN/RECOMMENDATIONS:   Conner marcial, chas, e-stim, mobs           "

## 2024-01-18 ENCOUNTER — PHYSICAL THERAPY (OUTPATIENT)
Dept: PHYSICAL THERAPY | Facility: REHABILITATION | Age: 69
End: 2024-01-18
Attending: INTERNAL MEDICINE
Payer: COMMERCIAL

## 2024-01-18 DIAGNOSIS — M54.31 SCIATICA, RIGHT SIDE: ICD-10-CM

## 2024-01-18 PROCEDURE — 97110 THERAPEUTIC EXERCISES: CPT

## 2024-01-18 NOTE — OP THERAPY DAILY TREATMENT
"  Outpatient Physical Therapy  DAILY TREATMENT     [facility] Physical Therapy [address]  [address]  [place] [address]  Phone:  [phone]  Fax:  [phone]    Date: 01/18/2024    Patient: Leslie Tamez  YOB: 1955  MRN: 4' late        Chief Complaint: No chief complaint on file.    Visit #: 3    SUBJECTIVE:  No pain , sore muscle for ex with ball..  one bout of pain 2 days ago--sx resolved after ex.    OBJECTIVE:            Therapeutic Treatments and Modalities:     Therapeutic Treatment and Modalities Summary: Superman 1'29\"  Ball bridge with hamstring curl x 30\"  Ball bridge with alternating leg x 30\"  Ball wall squats x 15           Time-based treatments/modalities:    Physical Therapy Timed Treatment Charges  Therapeutic exercise minutes (CPT 72104): 30 minutes      Pain rating (1-10) before treatment:  0  Pain rating (1-10) after treatment:  0    ASSESSMENT:   Progressing well but stil limited with fair post chain and l.e. strength.endurance  PLAN/RECOMMENDATIONS:   chas Cope, e-stim, mobs           "

## 2024-01-24 ENCOUNTER — APPOINTMENT (OUTPATIENT)
Dept: PHYSICAL THERAPY | Facility: REHABILITATION | Age: 69
End: 2024-01-24
Attending: INTERNAL MEDICINE
Payer: COMMERCIAL

## 2024-01-24 ENCOUNTER — APPOINTMENT (OUTPATIENT)
Dept: RADIOLOGY | Facility: MEDICAL CENTER | Age: 69
End: 2024-01-24
Attending: INTERNAL MEDICINE
Payer: COMMERCIAL

## 2024-02-02 ENCOUNTER — PHYSICAL THERAPY (OUTPATIENT)
Dept: PHYSICAL THERAPY | Facility: REHABILITATION | Age: 69
End: 2024-02-02
Attending: INTERNAL MEDICINE
Payer: COMMERCIAL

## 2024-02-02 DIAGNOSIS — M54.31 SCIATICA, RIGHT SIDE: ICD-10-CM

## 2024-02-02 PROCEDURE — 97110 THERAPEUTIC EXERCISES: CPT

## 2024-02-02 NOTE — OP THERAPY DAILY TREATMENT
"  Outpatient Physical Therapy  DAILY TREATMENT     [facility] Physical Therapy [address].  [address]  [place] [address]  Phone:  [phone]  Fax:  [phone]    Date: 02/02/2024    Patient: Leslie Tamez  YOB: 1955  MRN: 4' late        Chief Complaint: No chief complaint on file.    Visit #: 4    SUBJECTIVE:  Can't remember when she has had pain on R  but recent L glut pain at night when she rolls over in bed    OBJECTIVE:            Therapeutic Treatments and Modalities:     Therapeutic Treatment and Modalities Summary: Reil with L hip flexion-> reil legs straight  Seil l/s rotation L restricted > R rot  Supine l.e. trunk rotation --hep  Educated TNE\" push to, not through pain\" important to move when hurting for no apparent reason\"          Time-based treatments/modalities:    Physical Therapy Timed Treatment Charges  Therapeutic exercise minutes (CPT 18470): 38 minutes      Pain rating (1-10) before treatment:0/10 in standing   Pain rating (1-10) after treatment:  0    ASSESSMENT:   Progressing well but still limited with fair post chain and l.e. strength.endurance.  noted moderate fear avoidance with activity and pt. Fearful that she is going to break something  PLAN/RECOMMENDATIONS:   chas Cope, e-stim, mobs--review TNE--\" house alarm\"             "

## 2024-02-06 ENCOUNTER — APPOINTMENT (OUTPATIENT)
Dept: RADIOLOGY | Facility: MEDICAL CENTER | Age: 69
End: 2024-02-06
Attending: INTERNAL MEDICINE
Payer: COMMERCIAL

## 2024-02-07 PROCEDURE — RXMED WILLOW AMBULATORY MEDICATION CHARGE: Performed by: INTERNAL MEDICINE

## 2024-02-08 ENCOUNTER — HOSPITAL ENCOUNTER (OUTPATIENT)
Dept: RADIOLOGY | Facility: MEDICAL CENTER | Age: 69
End: 2024-02-08
Attending: INTERNAL MEDICINE
Payer: COMMERCIAL

## 2024-02-08 ENCOUNTER — PHARMACY VISIT (OUTPATIENT)
Dept: PHARMACY | Facility: MEDICAL CENTER | Age: 69
End: 2024-02-08
Payer: COMMERCIAL

## 2024-02-08 ENCOUNTER — APPOINTMENT (OUTPATIENT)
Dept: PHYSICAL THERAPY | Facility: REHABILITATION | Age: 69
End: 2024-02-08
Attending: INTERNAL MEDICINE
Payer: COMMERCIAL

## 2024-02-08 DIAGNOSIS — G89.29 CHRONIC RIGHT SHOULDER PAIN: ICD-10-CM

## 2024-02-08 DIAGNOSIS — M25.511 CHRONIC RIGHT SHOULDER PAIN: ICD-10-CM

## 2024-02-08 DIAGNOSIS — E05.90 SUBCLINICAL HYPERTHYROIDISM: ICD-10-CM

## 2024-02-08 PROCEDURE — 76536 US EXAM OF HEAD AND NECK: CPT

## 2024-02-08 PROCEDURE — 73030 X-RAY EXAM OF SHOULDER: CPT | Mod: RT

## 2024-02-15 ENCOUNTER — OFFICE VISIT (OUTPATIENT)
Dept: MEDICAL GROUP | Facility: MEDICAL CENTER | Age: 69
End: 2024-02-15
Payer: COMMERCIAL

## 2024-02-15 VITALS
BODY MASS INDEX: 24.35 KG/M2 | HEIGHT: 61 IN | DIASTOLIC BLOOD PRESSURE: 82 MMHG | WEIGHT: 128.97 LBS | OXYGEN SATURATION: 99 % | TEMPERATURE: 98.2 F | HEART RATE: 64 BPM | SYSTOLIC BLOOD PRESSURE: 138 MMHG

## 2024-02-15 DIAGNOSIS — E05.90 SUBCLINICAL HYPERTHYROIDISM: ICD-10-CM

## 2024-02-15 DIAGNOSIS — M25.511 CHRONIC RIGHT SHOULDER PAIN: ICD-10-CM

## 2024-02-15 DIAGNOSIS — J34.89 LESION OF NASAL CAVITY: ICD-10-CM

## 2024-02-15 DIAGNOSIS — I10 PRIMARY HYPERTENSION: Chronic | ICD-10-CM

## 2024-02-15 DIAGNOSIS — G89.29 CHRONIC RIGHT SHOULDER PAIN: ICD-10-CM

## 2024-02-15 DIAGNOSIS — E04.1 THYROID NODULE: ICD-10-CM

## 2024-02-15 PROCEDURE — 99214 OFFICE O/P EST MOD 30 MIN: CPT | Performed by: INTERNAL MEDICINE

## 2024-02-15 PROCEDURE — 3075F SYST BP GE 130 - 139MM HG: CPT | Performed by: INTERNAL MEDICINE

## 2024-02-15 PROCEDURE — 3079F DIAST BP 80-89 MM HG: CPT | Performed by: INTERNAL MEDICINE

## 2024-02-15 ASSESSMENT — PATIENT HEALTH QUESTIONNAIRE - PHQ9: CLINICAL INTERPRETATION OF PHQ2 SCORE: 0

## 2024-02-15 ASSESSMENT — FIBROSIS 4 INDEX: FIB4 SCORE: 1.24

## 2024-02-15 NOTE — LETTER
[employer] [facility]  Lizbeth Yost M.D.  [address]  [place] [address]  Fax: [phone]   Authorization for Release/Disclosure of   Protected Health Information   Name: LESLIE TAMEZ : 1955 SSN: [id number]   Address: [address] [place] [address] [place] [address] Phone:    [phone] (home)    I authorize the entity listed below to release/disclose the PHI below to:   [facility]/Lizbeth Yost M.D. and Lizbeth Yost M.D.   Provider or Entity Name:     Address   City, State, Zip   Phone:      Fax:     Reason for request: continuity of care   Information to be released:    [  ] LAST COLONOSCOPY,  including any PATH REPORT and follow-up  [  ] LAST FIT/COLOGUARD RESULT [  ] LAST DEXA  [  ] LAST MAMMOGRAM  [  ] LAST PAP  [  ] LAST LABS [  ] RETINA EXAM REPORT  [  ] IMMUNIZATION RECORDS  [  ] Release all info      [  ] Check here and initial the line next to each item to release ALL health information INCLUDING  _____ Care and treatment for drug and / or alcohol abuse  _____ HIV testing, infection status, or AIDS  _____ Genetic Testing    DATES OF SERVICE OR TIME PERIOD TO BE DISCLOSED: _____________  I understand and acknowledge that:  * This Authorization may be revoked at any time by you in writing, except if your health information has already been used or disclosed.  * Your health information that will be used or disclosed as a result of you signing this authorization could be re-disclosed by the recipient. If this occurs, your re-disclosed health information may no longer be protected by State or Federal laws.  * You may refuse to sign this Authorization. Your refusal will not affect your ability to obtain treatment.  * This Authorization becomes effective upon signing and will  on (date) __________.      If no date is indicated, this Authorization will  one (1) year from the signature date.    Name: Leslie Tamez  Signature: Date:   2/15/2024     PLEASE  FAX REQUESTED RECORDS BACK TO: [phone]

## 2024-02-15 NOTE — PROGRESS NOTES
Subjective:     CC:   Diagnoses of Thyroid nodule, Subclinical hyperthyroidism, Lesion of nasal cavity, Primary hypertension, and Chronic right shoulder pain were pertinent to this visit.    HPI: Leslie is a pleasant 69 y.o. female who presents today to discuss the following problems:    Problem   Thyroid Nodule    2/8/2024 2:58 PM     HISTORY/REASON FOR EXAM:  Subclinical hyperthyroidism.     TECHNIQUE/EXAM DESCRIPTION:  Ultrasound of the soft tissues of the head and neck.     COMPARISON:  None     FINDINGS:  The thyroid gland is homogeneous.  Vascularity is normal.     The right lobe of the thyroid gland measures 1.47 cm x 4.34 cm x 1.54 cm.  The left lobe of the thyroid gland measures 1.61 cm x 4.52 cm x 1.84 cm.  The isthmus measures 0.25 cm.     Nodules >= 1cm:  2     Nodule #1  Location:  Left  lower  Size:  2.11 x 1.66 x 1.29 cm  Composition:  Mixed-1  Echogenicity:  Hypoechoic-2  Shape:  Wider than tall-0  Margins:  Lobulated-2  Echogenic Foci:  Macroscopic-1     ACR TIRADS points/category:  6 - TR4 - Moderately Suspicious     Nodule #2  Location:  Left  mid  Size:  1.54 x 1.29 x 1.08 cm  Composition:  Solid-2  Echogenicity:  Isoechoic-1  Shape:  Wider than tall-0  Margins:  Smooth-0  Echogenic Foci:  Macroscopic-1     ACR TIRADS points/category:  4 - TR4 - Moderately Suspicious     IMPRESSION:     1.  There are 2 separate nodules located in the mid and lower portion of the left lobe of the thyroid gland. Ultrasound-guided biopsy of each of these nodules is recommended.     Lesion of Nasal Cavity    She has a small painful erythematous lesion in her right nostril.  She noticed it after she came back from a trip to [place].  Complains of crusting, pain and discomfort in the right nostril.    She will try normal saline irrigation and we will refer to ENT for evaluation.     Chronic Right Shoulder Pain    Chronic problem, started in September 2023.  Her pain is intermittent and she is also experiencing  tenderness of the deltoid muscle.  She believes she might of done something wrong in the gym, however there was no clear trauma.    She does not take Tylenol or ibuprofen, she is icing her shoulder after gym sessions.    Empty can test is negative, there is tenderness to palpation of the deltoid muscle.    X-ray showed mild arthritis, she is working with physical therapy.    Continue icing/NSAIDs/gentle stretching.       Hypertension    This is a chronic condition, generally well controlled  BP slightly elevated today 138/82   Current regimen includes: Atenolol 100 mg   Previous regimens: Atenolol-HCTZ  Patient is compliant  and reports no side effects with treatment.  Diet: DASH  Exercise: regular       Monitor BP at home, bring BP log to the next visit, consider medication adjustment.       Subclinical Hyperthyroidism    From prior PCP note:  TSH slightly low at 0.27 (ref 0.35-5.1) but FT4 normal at 1.14.   No hyperthyroid symptoms.     Thyroid ultrasound showed 2 large suspicious thyroid nodules, refer to endocrinology.           Past Medical History:   Diagnosis Date    DCIS (ductal carcinoma in situ)     Hypertension     Prediabetes        Current Outpatient Medications Ordered in Epic   Medication Sig Dispense Refill    [facility] (OSTEO BI-FLEX TRIPLE STRENGTH PO) Take  by mouth.      CALCIUM PO Take  by mouth.      atenolol (TENORMIN) 100 MG Tab Take 1 Tablet by mouth every day. 90 Tablet 3    letrozole (FEMARA) 2.5 MG Tab Take 1 Tablet by mouth every day. 90 Tablet 3    Omega-3 Fatty Acids (FISH OIL) 1000 MG Cap capsule Take 1,000 mg by mouth every day.      cyanocobalamin (VITAMIN B12) 1000 MCG Tab Take 1,000 mcg by mouth every day.       No current [facility]-ordered facility-administered medications on file.     Review of Systems   Musculoskeletal:  Positive for joint pain.   All other systems reviewed and are negative.    Objective:     Exam:  /82   Pulse 64   Temp 36.8 °C (98.2 °F)  "(Temporal)   Ht 1.549 m (5' 1\")   Wt 58.5 kg (128 lb 15.5 oz)   SpO2 99%   BMI 24.37 kg/m²  Body mass index is 24.37 kg/m².    Physical Exam  Constitutional:       Appearance: Normal appearance.   Cardiovascular:      Rate and Rhythm: Normal rate and regular rhythm.      Pulses: Normal pulses.      Heart sounds: Normal heart sounds. No murmur heard.  Pulmonary:      Effort: Pulmonary effort is normal. No respiratory distress.      Breath sounds: Normal breath sounds.   Neurological:      Mental Status: She is alert and oriented to person, place, and time.   Psychiatric:         Mood and Affect: Mood normal.       Assessment & Plan:   Leslie  is a pleasant 69 y.o. female with the following -     Problem List Items Addressed This Visit       Hypertension (Chronic)     Monitor BP, continue atenolol.         Chronic right shoulder pain    Lesion of nasal cavity    Relevant Orders    Referral to ENT    Subclinical hyperthyroidism     Stable, asymptomatic.  Pending referral to endocrinology.         Relevant Orders    Referral to Endocrinology    Thyroid nodule     Biopsy is recommended, refer to endocrinology.         Relevant Orders    Referral to Endocrinology    TSH    T3 FREE    FREE THYROXINE     Return for As previously scheduled appointment for.    Please note that this dictation was created using voice recognition software. I have made every reasonable attempt to correct obvious errors, but I expect that there are errors of grammar and possibly content that I did not discover before finalizing the note.        "

## 2024-02-20 ENCOUNTER — PHYSICAL THERAPY (OUTPATIENT)
Dept: PHYSICAL THERAPY | Facility: REHABILITATION | Age: 69
End: 2024-02-20
Attending: INTERNAL MEDICINE
Payer: COMMERCIAL

## 2024-02-20 DIAGNOSIS — M54.31 SCIATICA, RIGHT SIDE: ICD-10-CM

## 2024-02-20 PROCEDURE — 97014 ELECTRIC STIMULATION THERAPY: CPT

## 2024-02-20 PROCEDURE — 97110 THERAPEUTIC EXERCISES: CPT

## 2024-02-20 NOTE — OP THERAPY DAILY TREATMENT
"  Outpatient Physical Therapy  DAILY TREATMENT     [facility] [address].  [address]  [place] [address]  Phone:  [phone]  Fax:  [phone]    Date: 02/20/2024    Patient: Leslie Tamez  YOB: 1955  MRN: 4' late        Chief Complaint: No chief complaint on file.    Visit #: 5    SUBJECTIVE:  Less overall pain, able to walk/jopg tm x 60' with 1' jogging duration    OBJECTIVE:            Therapeutic Treatments and Modalities:     Therapeutic Treatment and Modalities Summary: Reil with L hip flexion-> reil legs straight  Patient o/p with breathing  L5-S1 with asis gd 4//centralized   REil wotj PT and towel o/p MWMs  Ball bridge x 1'15, 1'23\"  Bfb with marching  Educated TNE\" push to, not through pain\" important to move when hurting for no apparent reason\" House alarm analogy\" hurt does no mean harm\"  Russian 10/10 x 15 with mhp with ball roll and bfb         Time-based treatments/modalities:           Pain rating (1-10) before treatment:3/10 L medial glut in standing   Pain rating (1-10) after treatment:  0    ASSESSMENT:   Reported significant decrease in pain and centralized with chas progression--cont to struggle with body awareness and hip dissociation  Decreased palpatory tenderness to gluteal region after treatment. Fair  posture endurance  PLAN/RECOMMENDATIONS:   Core stab, chas, e-stim, mobs             "

## 2024-02-21 ENCOUNTER — TELEPHONE (OUTPATIENT)
Dept: SCHEDULING | Facility: IMAGING CENTER | Age: 69
End: 2024-02-21

## 2024-02-27 ENCOUNTER — PHARMACY VISIT (OUTPATIENT)
Dept: PHARMACY | Facility: MEDICAL CENTER | Age: 69
End: 2024-02-27
Payer: COMMERCIAL

## 2024-02-27 ENCOUNTER — PHYSICAL THERAPY (OUTPATIENT)
Dept: PHYSICAL THERAPY | Facility: REHABILITATION | Age: 69
End: 2024-02-27
Attending: INTERNAL MEDICINE
Payer: COMMERCIAL

## 2024-02-27 ENCOUNTER — OFFICE VISIT (OUTPATIENT)
Dept: MEDICAL GROUP | Facility: MEDICAL CENTER | Age: 69
End: 2024-02-27
Payer: COMMERCIAL

## 2024-02-27 VITALS
TEMPERATURE: 98.1 F | HEART RATE: 74 BPM | BODY MASS INDEX: 24.17 KG/M2 | DIASTOLIC BLOOD PRESSURE: 88 MMHG | HEIGHT: 61 IN | OXYGEN SATURATION: 98 % | WEIGHT: 128 LBS | SYSTOLIC BLOOD PRESSURE: 146 MMHG

## 2024-02-27 DIAGNOSIS — M54.31 SCIATICA, RIGHT SIDE: ICD-10-CM

## 2024-02-27 DIAGNOSIS — R07.9 CHEST PAIN, UNSPECIFIED TYPE: ICD-10-CM

## 2024-02-27 DIAGNOSIS — I10 PRIMARY HYPERTENSION: Chronic | ICD-10-CM

## 2024-02-27 PROCEDURE — 97110 THERAPEUTIC EXERCISES: CPT

## 2024-02-27 PROCEDURE — 99214 OFFICE O/P EST MOD 30 MIN: CPT | Performed by: INTERNAL MEDICINE

## 2024-02-27 PROCEDURE — 3074F SYST BP LT 130 MM HG: CPT | Performed by: INTERNAL MEDICINE

## 2024-02-27 PROCEDURE — RXMED WILLOW AMBULATORY MEDICATION CHARGE: Performed by: INTERNAL MEDICINE

## 2024-02-27 PROCEDURE — 3078F DIAST BP <80 MM HG: CPT | Performed by: INTERNAL MEDICINE

## 2024-02-27 PROCEDURE — 93000 ELECTROCARDIOGRAM COMPLETE: CPT | Performed by: INTERNAL MEDICINE

## 2024-02-27 RX ORDER — HYDROCHLOROTHIAZIDE 12.5 MG/1
12.5 TABLET ORAL DAILY
Qty: 90 TABLET | Refills: 1 | Status: SHIPPED | OUTPATIENT
Start: 2024-02-27 | End: 2024-03-20

## 2024-02-27 ASSESSMENT — FIBROSIS 4 INDEX: FIB4 SCORE: 1.24

## 2024-02-27 NOTE — OP THERAPY DAILY TREATMENT
"  Outpatient Physical Therapy  DAILY TREATMENT     [facility] Physical Therapy [address]  [address]  [place] [address]  Phone:  [phone]  Fax:  [phone]    Date: 02/27/2024    Patient: Leslie Tamez  YOB: 1955  MRN:     Chief Complaint: No chief complaint on file.    Visit #: 6    SUBJECTIVE:  Patient reported that she has not had pain past week but reported that she struggled to \"catch her breath\" and had low energy for 2-3 days.  Denied h/a, fever but reported associated chest wall pain    Patient reported that she also reported L upper chest pain during the 2 days of fatigue and difficulty breathing\" I couldn't catch my breath\"      OBJECTIVE:   HR: 76  BP R: 148/96  L 154.98  R:!58  CA auscultation: normal sounds, no bruit          Therapeutic Treatments and Modalities:     Therapeutic Treatment and Modalities Summary:      Instructed patient to hold off on any vigorous ex until she is cleared by physician    Time-based treatments/modalities:    Physical Therapy Timed Treatment Charges  Therapeutic exercise minutes (CPT 41693): 15 minutes        ASSESSMENT:   Patient c/o of difficulty breathing last week accompanied by associated L upper thorax pain.  Patient presented with  elevated BP and given medical history and complaints, patient was advised to contact physician today  for further workup.  Patient denies any symptoms for the past 2 days but given reported complaints of malaise, chest wall pain and shortness of breath that lasted a day, PT was held today  PLAN/RECOMMENDATIONS:       ### Pt. Instructed to call md today and for further work-up prior to progressing PT at home or returning to clinic  Core chas marcial, e-stim, sridhar             "

## 2024-02-27 NOTE — ASSESSMENT & PLAN NOTE
Chronic, stable, adjusted BP regimen: Start hydrochlorothiazide 25 mg daily, continue atenolol 100 mg daily.

## 2024-02-27 NOTE — PROGRESS NOTES
Subjective:     CC:   Diagnoses of Primary hypertension and Chest pain, unspecified type were pertinent to this visit.      HPI: Leslie is a pleasant 69 y.o. female who presents today for evaluation of the following problems:  Problem   Chest Pain    Ongoing problem, symptoms started a week or so ago with soreness in the left hemithorax.  It gets somewhat better if she massages it.  It does not appear that it is related to exertion.  EKG obtained in office  EKG Interpretation   Ordered and interpreted by Lizbeth Yost MD  Rhythm: normal sinus   Rate: normal, 59 bpm  Axis: normal   Ectopy: none   Conduction: normal   ST Segments: no acute change   T Waves: no acute change   Q Waves: none   Clinical Impression: no acute changes and possible left atrial enlargement.    The pain is likely musculoskeletal.  Manage with gentle stretching, topical NSAIDs.       Hypertension    This is a chronic condition, generally well controlled  BP slightly elevated today 148/88   Current regimen includes: Atenolol 100 mg   Previous regimens: Atenolol-HCTZ  Patient is compliant  and reports no side effects with treatment.  Diet: DASH  Exercise: regular       She presents today because she was sent from her physical therapist office due to elevated blood pressure.  I could not find record of the blood pressure, however she was told it was high and she needed to see her PCP prior to continuation of the physical therapy.  Last week she was feeling than usual: She was trying to catch her breath, she was feeling tired overall.  She made appointment with the office to see a nurse practitioner, however by Friday she felt better and canceled her visit.  On Sunday she even went to the gym and did some treadmill for approximately 30 minutes.  On Monday she felt even better, she was able to clean her house. Today she presented for physical therapy appointment and was unable to participate due to high blood pressure.    Changes made  "today: Start hydrochlorothiazide 12.5 mg daily, may increase to 25 mg daily for BP over 130/80.    Patient blood pressure cuff was giving very high readings: 180/120 in office, which did not correspond to office BP cuff numbers.     Advised to buy a new blood pressure cuff and bring her BP log to her next visit.              Past Medical History:   Diagnosis Date    DCIS (ductal carcinoma in situ)     Hypertension     Prediabetes      Current Outpatient Medications Ordered in Epic   Medication Sig Dispense Refill    hydroCHLOROthiazide 12.5 MG tablet Take 1 Tablet by mouth every day. 90 Tablet 1    Misc Natural Products (OSTEO BI-FLEX TRIPLE STRENGTH PO) Take  by mouth.      CALCIUM PO Take  by mouth.      atenolol (TENORMIN) 100 MG Tab Take 1 Tablet by mouth every day. 90 Tablet 3    letrozole (FEMARA) 2.5 MG Tab Take 1 Tablet by mouth every day. 90 Tablet 3    Omega-3 Fatty Acids (FISH OIL) 1000 MG Cap capsule Take 1,000 mg by mouth every day.      cyanocobalamin (VITAMIN B12) 1000 MCG Tab Take 1,000 mcg by mouth every day.       No current [facility]-ordered facility-administered medications on file.     Review of Systems   Cardiovascular:  Positive for chest pain.   All other systems reviewed and are negative.    Objective:     Exam:  BP (!) 146/88 (BP Location: Right arm)   Pulse 74   Temp 36.7 °C (98.1 °F) (Temporal)   Ht 1.549 m (5' 1\")   Wt 58.1 kg (128 lb)   SpO2 98%   BMI 24.19 kg/m²  Body mass index is 24.19 kg/m².    Physical Exam  Constitutional:       Appearance: Normal appearance.   Cardiovascular:      Rate and Rhythm: Normal rate and regular rhythm.      Pulses: Normal pulses.      Heart sounds: Normal heart sounds. No murmur heard.  Pulmonary:      Effort: Pulmonary effort is normal. No respiratory distress.      Breath sounds: Normal breath sounds.   Neurological:      Mental Status: She is alert and oriented to person, place, and time.   Psychiatric:         Mood and Affect: Mood normal. "       Assessment & Plan:   Leslie  is a pleasant 69 y.o. female with the following -     Problem List Items Addressed This Visit       Hypertension (Chronic)     Chronic, stable, adjusted BP regimen: Start hydrochlorothiazide 25 mg daily, continue atenolol 100 mg daily.         Relevant Medications    hydroCHLOROthiazide 12.5 MG tablet    Chest pain    Relevant Orders    EKG - Clinic Performed (Completed)     Return in about 3 weeks (around 3/19/2024), or if symptoms worsen or fail to improve.    Please note that this dictation was created using voice recognition software. I have made every reasonable attempt to correct obvious errors, but I expect that there are errors of grammar and possibly content that I did not discover before finalizing the note.

## 2024-03-04 ENCOUNTER — APPOINTMENT (OUTPATIENT)
Dept: MEDICAL GROUP | Facility: MEDICAL CENTER | Age: 69
End: 2024-03-04
Payer: COMMERCIAL

## 2024-03-06 ENCOUNTER — APPOINTMENT (OUTPATIENT)
Dept: PHYSICAL THERAPY | Facility: REHABILITATION | Age: 69
End: 2024-03-06
Attending: INTERNAL MEDICINE
Payer: COMMERCIAL

## 2024-03-11 PROCEDURE — RXMED WILLOW AMBULATORY MEDICATION CHARGE: Performed by: INTERNAL MEDICINE

## 2024-03-12 ENCOUNTER — PHARMACY VISIT (OUTPATIENT)
Dept: PHARMACY | Facility: MEDICAL CENTER | Age: 69
End: 2024-03-12
Payer: COMMERCIAL

## 2024-03-20 ENCOUNTER — PHARMACY VISIT (OUTPATIENT)
Dept: PHARMACY | Facility: MEDICAL CENTER | Age: 69
End: 2024-03-20
Payer: COMMERCIAL

## 2024-03-20 ENCOUNTER — PHYSICAL THERAPY (OUTPATIENT)
Dept: PHYSICAL THERAPY | Facility: REHABILITATION | Age: 69
End: 2024-03-20
Attending: INTERNAL MEDICINE
Payer: COMMERCIAL

## 2024-03-20 ENCOUNTER — OFFICE VISIT (OUTPATIENT)
Dept: MEDICAL GROUP | Facility: MEDICAL CENTER | Age: 69
End: 2024-03-20
Payer: COMMERCIAL

## 2024-03-20 VITALS
TEMPERATURE: 97.6 F | HEART RATE: 68 BPM | OXYGEN SATURATION: 95 % | BODY MASS INDEX: 24.56 KG/M2 | HEIGHT: 61 IN | DIASTOLIC BLOOD PRESSURE: 78 MMHG | SYSTOLIC BLOOD PRESSURE: 132 MMHG | WEIGHT: 130.07 LBS

## 2024-03-20 DIAGNOSIS — I10 PRIMARY HYPERTENSION: ICD-10-CM

## 2024-03-20 DIAGNOSIS — M54.31 SCIATICA, RIGHT SIDE: ICD-10-CM

## 2024-03-20 PROCEDURE — 97140 MANUAL THERAPY 1/> REGIONS: CPT

## 2024-03-20 PROCEDURE — 3075F SYST BP GE 130 - 139MM HG: CPT | Performed by: INTERNAL MEDICINE

## 2024-03-20 PROCEDURE — RXMED WILLOW AMBULATORY MEDICATION CHARGE: Performed by: INTERNAL MEDICINE

## 2024-03-20 PROCEDURE — 97014 ELECTRIC STIMULATION THERAPY: CPT

## 2024-03-20 PROCEDURE — 3078F DIAST BP <80 MM HG: CPT | Performed by: INTERNAL MEDICINE

## 2024-03-20 PROCEDURE — 99214 OFFICE O/P EST MOD 30 MIN: CPT | Performed by: INTERNAL MEDICINE

## 2024-03-20 PROCEDURE — 97110 THERAPEUTIC EXERCISES: CPT

## 2024-03-20 RX ORDER — LOSARTAN POTASSIUM 100 MG/1
100 TABLET ORAL DAILY
Qty: 90 TABLET | Refills: 1 | Status: SHIPPED | OUTPATIENT
Start: 2024-03-20

## 2024-03-20 ASSESSMENT — ENCOUNTER SYMPTOMS
DIZZINESS: 0
SHORTNESS OF BREATH: 0
HEADACHES: 0

## 2024-03-20 ASSESSMENT — FIBROSIS 4 INDEX: FIB4 SCORE: 1.24

## 2024-03-20 NOTE — ASSESSMENT & PLAN NOTE
Chronic, unstable.    Changes made today: Stop HCTZ, start losartan 100 mg daily.    Exercise as tolerated, DASH diet, monitor BP at home and bring BP log and BP cuff to your next visit.

## 2024-03-20 NOTE — PATIENT INSTRUCTIONS
Changes made today: Stop HCTZ, start losartan 100 mg daily.    Exercise as tolerated, DASH diet, monitor BP at home and bring BP log and BP cuff to your next visit.

## 2024-03-20 NOTE — OP THERAPY DAILY TREATMENT
"  Outpatient Physical Therapy  DAILY TREATMENT     [facility] Physical Therapy [place] [address] [facility] [address] [facility]  [address]  [place] [address]  Phone:  [phone]  Fax:  [phone]    Date: 03/20/2024    Patient: Leslie Tamez  YOB: 1955  MRN:     Chief Complaint: No chief complaint on file.    Visit #: 7    SUBJECTIVE:  Patient reported that she has not had pain past week but reported that she struggled to \"catch her breath\" and had low energy for 2-3 days.  Denied h/a, fever but reported associated chest wall pain    Patient reported that she also reported L upper chest pain during the 2 days of fatigue and difficulty breathing\" I couldn't catch my breath\"      OBJECTIVE:   HR: 76  BP R: 148/96  L 154.98  R:!58  CA auscultation: normal sounds, no bruit          Therapeutic Treatments and Modalities:     Therapeutic Treatment and Modalities Summary:      Instructed patient to hold off on any vigorous ex until she is cleared by physician    Time-based treatments/modalities:             ASSESSMENT:   Patient c/o of difficulty breathing last week accompanied by associated L upper thorax pain.  Patient presented with  elevated BP and given medical history and complaints, patient was advised to contact physician today  for further workup.  Patient denies any symptoms for the past 2 days but given reported complaints of malaise, chest wall pain and shortness of breath that lasted a day, PT was held today  PLAN/RECOMMENDATIONS:       ### Pt. Instructed to call md today and for further work-up prior to progressing PT at home or returning to clinic  Core chas marcial e-stim, sridhar             "

## 2024-03-20 NOTE — OP THERAPY DAILY TREATMENT
Outpatient Physical Therapy  DAILY TREATMENT     [facility] Physical Therapy [address].  [address]  [place] [address]  Phone:  [phone]  Fax:  [phone]    Date: 03/20/2024    Patient: Leslie Tamez  YOB: 1955  MRN: 4' late        Chief Complaint: No chief complaint on file.    Visit #: 7    SUBJECTIVE:  Back to running and doing better.  Only one bout of pain since last visit and that was related to sitting--able to control pain with ext ex,  Patient feels pretty good with her back but c/o persistent R shoulder pain  OBJECTIVE:  Shoulder EVAL :  R shoulder pain for the past year that flares with certain activity  Aggsgoalsstg 2-4/  LTG6-8 weeks:  Seated flies at gym w/o pain-- ltg  Reach behind to back seat to grab bag without painstg  Rod bra w/o pain behind back--ltg  Bench press w/o  pain--ltg  DASH< 7 stg  DASH< 3 LTG    Fle:170  Lrq288--je pain  Er90 --erp  Hbbt12 erp  L: t3    Resisted cuff:  Er strong/ir strong  Empty can mild, strong  Vera +  Er lag: +  DASH 10%                Therapeutic Treatments and Modalities:     Therapeutic Treatment and Modalities Summary: Stm to infra  Scap retraction --hep  Box box with #1--hep  Cupping to infra with gh flexion in s/l  Saudi Arabian 10/10 infra/deltoid  scap retraction box #2 x 15'  mhp           Time-based treatments/modalities:    Physical Therapy Timed Treatment Charges  Manual therapy minutes (CPT 60509): 20 minutes  Therapeutic exercise minutes (CPT 43601): 10 minutes      Pain rating (1-10) before treatment:3/10 L medial glut in standing   Pain rating (1-10) after treatment:  0  goals  ASSESSMENT:   Presents with RTC  tendinopathy to infra spinatus with trg pt tenderness-pt. Should do well for goals  All goals met for l/s   PLAN/RECOMMENDATIONS:   Core aniket, chas, e-stim, mobs

## 2024-03-20 NOTE — PROGRESS NOTES
"Subjective:     CC:   Chief Complaint   Patient presents with    Follow-Up     BP      The encounter diagnosis was Primary hypertension.    HPI: Leslie is a pleasant 69 y.o. female who presents today to discuss the following problems:    Problem   Hypertension    This is a chronic condition, generally well controlled  Home BP numbers ranging in 140s-160s SBP.  Current regimen includes: Atenolol 100 mg and HCTZ 12.5 mg  Previous regimens: Atenolol-HCTZ  Patient is compliant  and reports no side effects with treatment.  Diet: DASH  Exercise: regular       Changes made today: Stop HCTZ, start losartan 100 mg daily.    Exercise as tolerated, DASH diet, monitor BP at home and bring BP log and BP cuff to your next visit.         Past Medical History:   Diagnosis Date    DCIS (ductal carcinoma in situ)     Hypertension     Prediabetes      Current Outpatient Medications Ordered in Epic   Medication Sig Dispense Refill    losartan (COZAAR) 100 MG Tab Take 1 Tablet by mouth every day. 90 Tablet 1    Misc Natural Products (OSTEO BI-FLEX TRIPLE STRENGTH PO) Take  by mouth.      CALCIUM PO Take  by mouth.      atenolol (TENORMIN) 100 MG Tab Take 1 Tablet by mouth every day. 90 Tablet 3    letrozole (FEMARA) 2.5 MG Tab Take 1 Tablet by mouth every day. 90 Tablet 3    Omega-3 Fatty Acids (FISH OIL) 1000 MG Cap capsule Take 1,000 mg by mouth every day.      cyanocobalamin (VITAMIN B12) 1000 MCG Tab Take 1,000 mcg by mouth every day.       No current [facility]-ordered facility-administered medications on file.     Review of Systems   Respiratory:  Negative for shortness of breath.    Cardiovascular:  Negative for chest pain.   Neurological:  Negative for dizziness and headaches.   All other systems reviewed and are negative.    Objective:     Exam:  /78 (BP Location: Right arm, Patient Position: Sitting, BP Cuff Size: Adult)   Pulse 68   Temp 36.4 °C (97.6 °F) (Temporal)   Ht 1.549 m (5' 1\")   Wt 59 kg (130 lb 1.1 oz)   SpO2 " 95%   BMI 24.58 kg/m²  Body mass index is 24.58 kg/m².    Physical Exam  Constitutional:       Appearance: Normal appearance.   HENT:      Head: Normocephalic and atraumatic.   Cardiovascular:      Rate and Rhythm: Normal rate and regular rhythm.      Pulses: Normal pulses.      Heart sounds: Normal heart sounds. No murmur heard.  Pulmonary:      Effort: Pulmonary effort is normal. No respiratory distress.      Breath sounds: No wheezing.   Neurological:      Mental Status: She is alert.   Psychiatric:         Mood and Affect: Mood normal.       Labs:  pending     Assessment & Plan:   Leslie  is a pleasant 69 y.o. female with the following -     Problem List Items Addressed This Visit       Hypertension (Chronic)     Chronic, unstable.    Changes made today: Stop HCTZ, start losartan 100 mg daily.    Exercise as tolerated, DASH diet, monitor BP at home and bring BP log and BP cuff to your next visit.         Relevant Medications    losartan (COZAAR) 100 MG Tab     Return in about 3 weeks (around 4/10/2024), or if symptoms worsen or fail to improve, for Follow up on HTN.    Please note that this dictation was created using voice recognition software. I have made every reasonable attempt to correct obvious errors, but I expect that there are errors of grammar and possibly content that I did not discover before finalizing the note.

## 2024-03-21 PROCEDURE — RXMED WILLOW AMBULATORY MEDICATION CHARGE: Performed by: OTOLARYNGOLOGY

## 2024-03-22 ENCOUNTER — HOSPITAL ENCOUNTER (OUTPATIENT)
Dept: LAB | Facility: MEDICAL CENTER | Age: 69
End: 2024-03-22
Attending: INTERNAL MEDICINE
Payer: COMMERCIAL

## 2024-03-22 ENCOUNTER — PHARMACY VISIT (OUTPATIENT)
Dept: PHARMACY | Facility: MEDICAL CENTER | Age: 69
End: 2024-03-22
Payer: COMMERCIAL

## 2024-03-22 DIAGNOSIS — E04.1 THYROID NODULE: ICD-10-CM

## 2024-03-22 LAB
T3FREE SERPL-MCNC: 3.75 PG/ML (ref 2–4.4)
T4 FREE SERPL-MCNC: 1.29 NG/DL (ref 0.93–1.7)
TSH SERPL DL<=0.005 MIU/L-ACNC: 0.27 UIU/ML (ref 0.38–5.33)

## 2024-03-22 PROCEDURE — 36415 COLL VENOUS BLD VENIPUNCTURE: CPT

## 2024-03-22 PROCEDURE — 84439 ASSAY OF FREE THYROXINE: CPT

## 2024-03-22 PROCEDURE — 84481 FREE ASSAY (FT-3): CPT

## 2024-03-22 PROCEDURE — 84443 ASSAY THYROID STIM HORMONE: CPT

## 2024-03-27 ENCOUNTER — PHYSICAL THERAPY (OUTPATIENT)
Dept: PHYSICAL THERAPY | Facility: REHABILITATION | Age: 69
End: 2024-03-27
Attending: INTERNAL MEDICINE
Payer: COMMERCIAL

## 2024-03-27 DIAGNOSIS — M25.511 CHRONIC RIGHT SHOULDER PAIN: ICD-10-CM

## 2024-03-27 DIAGNOSIS — G89.29 CHRONIC RIGHT SHOULDER PAIN: ICD-10-CM

## 2024-03-27 PROCEDURE — 97110 THERAPEUTIC EXERCISES: CPT

## 2024-03-27 PROCEDURE — 97140 MANUAL THERAPY 1/> REGIONS: CPT

## 2024-03-27 NOTE — OP THERAPY DAILY TREATMENT
Outpatient Physical Therapy  DAILY TREATMENT     [facility] Physical Therapy [address]  [address]  [place] [address]  Phone:  [phone]  Fax:  [phone]    Date: 03/27/2024    Patient: Leslie Tamez  YOB: 1955  MRN: 4' late        Chief Complaint: No chief complaint on file.    Visit #: 8    SUBJECTIVE:  Went to gym with some soreness during but no t afterwards  OBJECTIVE:    R   Fle:170  [id number]  Er90 --erp  Hbbt12 tight  L: t3                    Therapeutic Treatments and Modalities:     Therapeutic Treatment and Modalities Summary: Stm to infra  ART with hbb to infra  S/l running man u.e. 2lbs wt to fatigue x 4 bouts  A/p and p/a  ghj mobs gd 2-3 at end ranges  Cupping to infra with gh flexion in s/ w/ 2lbs wt,.  Cups with hbb wall push off--hep  #1 walkback angels bent arm --hep to fatigue x 1'    Norwegian 10/10 infra/deltoid box x5' then 5/5/ mhp x 10'           Time-based treatments/modalities:           Pain rating (1-10) before treatment:0/10 at rest  Pain rating (1-10) after treatment:  0  Goals  Hbb t12  ASSESSMENT:   Significant increase with hbb after treatment w/ cont. Noted TTP infra--increasing strength and rom  PLAN/RECOMMENDATIONS:   Core stab, chas, e-stim, mobs

## 2024-04-02 ENCOUNTER — APPOINTMENT (OUTPATIENT)
Dept: PHYSICAL THERAPY | Facility: REHABILITATION | Age: 69
End: 2024-04-02
Attending: INTERNAL MEDICINE
Payer: COMMERCIAL

## 2024-04-04 ENCOUNTER — APPOINTMENT (OUTPATIENT)
Dept: PHYSICAL THERAPY | Facility: REHABILITATION | Age: 69
End: 2024-04-04
Attending: INTERNAL MEDICINE
Payer: COMMERCIAL

## 2024-04-08 ENCOUNTER — PHARMACY VISIT (OUTPATIENT)
Dept: PHARMACY | Facility: MEDICAL CENTER | Age: 69
End: 2024-04-08
Payer: COMMERCIAL

## 2024-04-08 PROCEDURE — RXMED WILLOW AMBULATORY MEDICATION CHARGE: Performed by: INTERNAL MEDICINE

## 2024-04-10 ENCOUNTER — APPOINTMENT (OUTPATIENT)
Dept: MEDICAL GROUP | Facility: MEDICAL CENTER | Age: 69
End: 2024-04-10
Payer: COMMERCIAL

## 2024-04-11 ENCOUNTER — PHYSICAL THERAPY (OUTPATIENT)
Dept: PHYSICAL THERAPY | Facility: REHABILITATION | Age: 69
End: 2024-04-11
Attending: INTERNAL MEDICINE
Payer: COMMERCIAL

## 2024-04-11 DIAGNOSIS — G89.29 CHRONIC RIGHT SHOULDER PAIN: ICD-10-CM

## 2024-04-11 DIAGNOSIS — M25.511 CHRONIC RIGHT SHOULDER PAIN: ICD-10-CM

## 2024-04-11 PROCEDURE — 97140 MANUAL THERAPY 1/> REGIONS: CPT

## 2024-04-11 PROCEDURE — 97014 ELECTRIC STIMULATION THERAPY: CPT

## 2024-04-11 NOTE — OP THERAPY DAILY TREATMENT
Outpatient Physical Therapy  DAILY TREATMENT     [facility] Physical Therapy [address]  [address]  [place] [address]  Phone:  [phone]  Fax:  [phone]    Date: 04/11/2024    Patient: Leslie Tamez  YOB: 1955  MRN: 4' late        Chief Complaint: No chief complaint on file.    Visit #: 9    SUBJECTIVE:  Sick past 2 weeks and just returned to gym this week with minimal ex.  OBJECTIVE:  Resisited cuff: strong, no pain--all tests  R   Fle:175  Ncn640  Er90 --erp  Hbbt12 tight  L: t3                    Therapeutic Treatments and Modalities:     Therapeutic Treatment and Modalities Summary:   ART with hbb to infra, supra and deltoid  Art pec and sub scap  Russian 10/10 deltoids x 8' then 5/5   ,hp S/l running man u.e. 2lbs wt --15' total  A/p and p/a  ghj mobs gd 2-3 at end ranges    Cups with hbb wall push off--hep  #1 walkback angels bent arm --hep to fatigue--reviewed    Russian 10/10 infra/deltoid box x5' then 5/5/ mhp x 10'           Time-based treatments/modalities:    Physical Therapy Timed Treatment Charges  Manual therapy minutes (CPT 37409): 25 minutes      Pain rating (1-10) before treatment:0/10 at rest  Pain rating (1-10) after treatment:  0  Goals  Hbb t10  ASSESSMENT:   Good rom but limited HEP progression due to recent illness--cont limit wotjh hbb and ttp deltoid  PLAN/RECOMMENDATIONS:   Core stab, chas, e-stim, mobs

## 2024-04-15 PROCEDURE — RXMED WILLOW AMBULATORY MEDICATION CHARGE: Performed by: INTERNAL MEDICINE

## 2024-04-16 ENCOUNTER — HOSPITAL ENCOUNTER (OUTPATIENT)
Dept: LAB | Facility: MEDICAL CENTER | Age: 69
End: 2024-04-16
Attending: INTERNAL MEDICINE
Payer: COMMERCIAL

## 2024-04-16 ENCOUNTER — OFFICE VISIT (OUTPATIENT)
Dept: MEDICAL GROUP | Facility: MEDICAL CENTER | Age: 69
End: 2024-04-16
Payer: COMMERCIAL

## 2024-04-16 ENCOUNTER — PHARMACY VISIT (OUTPATIENT)
Dept: PHARMACY | Facility: MEDICAL CENTER | Age: 69
End: 2024-04-16
Payer: COMMERCIAL

## 2024-04-16 VITALS
WEIGHT: 127.2 LBS | OXYGEN SATURATION: 98 % | DIASTOLIC BLOOD PRESSURE: 72 MMHG | SYSTOLIC BLOOD PRESSURE: 136 MMHG | RESPIRATION RATE: 18 BRPM | TEMPERATURE: 97.7 F | HEART RATE: 65 BPM | BODY MASS INDEX: 24.02 KG/M2 | HEIGHT: 61 IN

## 2024-04-16 DIAGNOSIS — I10 PRIMARY HYPERTENSION: Chronic | ICD-10-CM

## 2024-04-16 LAB
25(OH)D3 SERPL-MCNC: 33 NG/ML (ref 30–100)
T3FREE SERPL-MCNC: 3.8 PG/ML (ref 2–4.4)
T4 FREE SERPL-MCNC: 1.39 NG/DL (ref 0.93–1.7)
THYROPEROXIDASE AB SERPL-ACNC: 21 IU/ML (ref 0–9)
TSH SERPL DL<=0.005 MIU/L-ACNC: 0.14 UIU/ML (ref 0.38–5.33)
VIT B12 SERPL-MCNC: 1098 PG/ML (ref 211–911)

## 2024-04-16 PROCEDURE — 3075F SYST BP GE 130 - 139MM HG: CPT | Performed by: INTERNAL MEDICINE

## 2024-04-16 PROCEDURE — 3078F DIAST BP <80 MM HG: CPT | Performed by: INTERNAL MEDICINE

## 2024-04-16 PROCEDURE — 82306 VITAMIN D 25 HYDROXY: CPT

## 2024-04-16 PROCEDURE — 86376 MICROSOMAL ANTIBODY EACH: CPT

## 2024-04-16 PROCEDURE — 84481 FREE ASSAY (FT-3): CPT

## 2024-04-16 PROCEDURE — 84445 ASSAY OF TSI GLOBULIN: CPT

## 2024-04-16 PROCEDURE — 84439 ASSAY OF FREE THYROXINE: CPT

## 2024-04-16 PROCEDURE — 36415 COLL VENOUS BLD VENIPUNCTURE: CPT

## 2024-04-16 PROCEDURE — 99214 OFFICE O/P EST MOD 30 MIN: CPT | Performed by: INTERNAL MEDICINE

## 2024-04-16 PROCEDURE — 82607 VITAMIN B-12: CPT

## 2024-04-16 PROCEDURE — 84443 ASSAY THYROID STIM HORMONE: CPT

## 2024-04-16 PROCEDURE — RXMED WILLOW AMBULATORY MEDICATION CHARGE: Performed by: INTERNAL MEDICINE

## 2024-04-16 RX ORDER — NIFEDIPINE 30 MG
30 TABLET, EXTENDED RELEASE ORAL DAILY
Qty: 90 TABLET | Refills: 1 | Status: SHIPPED | OUTPATIENT
Start: 2024-04-16

## 2024-04-16 ASSESSMENT — ENCOUNTER SYMPTOMS: SHORTNESS OF BREATH: 0

## 2024-04-16 ASSESSMENT — FIBROSIS 4 INDEX: FIB4 SCORE: 1.24

## 2024-04-16 NOTE — PROGRESS NOTES
Subjective:     CC:   Chief Complaint   Patient presents with    Follow-Up     The encounter diagnosis was Primary hypertension.    HPI: Leslie is a pleasant 69 y.o. female who presents today unaccompanied to discuss the following problems:  Problem   Hypertension    This is a chronic condition, previously was well controlled  Home BP numbers: 130-150s/70-80s.   Current regimen includes: Atenolol 100 mg and Losartan 100 mg  Previous regimens: Atenolol-HCTZ  Patient is compliant  and reports no side effects with treatment.  Diet: DASH  Exercise: regular   Since last visit we have started losartan 100 mg daily.  Changes made today: Start nifedipine 30 mg daily.               Past Medical History:   Diagnosis Date    DCIS (ductal carcinoma in situ)     Hypertension     Prediabetes      Current Outpatient Medications Ordered in Epic   Medication Sig Dispense Refill    NIFEdipine (ADALAT CC) 30 MG CR tablet Take 1 Tablet by mouth every day. 90 Tablet 1    azelastine (ASTELIN) 137 MCG/SPRAY nasal spray Instill 2 sprays in each nostril twice a day 30 mL 4    fluticasone (FLONASE) 50 MCG/ACT nasal spray Instill 1 spray in each nostril once a day for 90 days 16 g 4    montelukast (SINGULAIR) 10 MG Tab Take 1 tablet by mouth once a day 90 Tablet 0    olopatadine (PATANOL) 0.1 % ophthalmic solution Instill 1 drop into affected eye twice a day 5 mL 3    losartan (COZAAR) 100 MG Tab Take 1 Tablet by mouth every day. 90 Tablet 1    Misc Natural Products (OSTEO BI-FLEX TRIPLE STRENGTH PO) Take  by mouth.      CALCIUM PO Take  by mouth.      atenolol (TENORMIN) 100 MG Tab Take 1 Tablet by mouth every day. 90 Tablet 3    letrozole (FEMARA) 2.5 MG Tab Take 1 Tablet by mouth every day. 90 Tablet 3    Omega-3 Fatty Acids (FISH OIL) 1000 MG Cap capsule Take 1,000 mg by mouth every day.      cyanocobalamin (VITAMIN B12) 1000 MCG Tab Take 1,000 mcg by mouth every day.       No current [facility]-ordered facility-administered medications on  "file.     Review of Systems   Respiratory:  Negative for shortness of breath.    Cardiovascular:  Negative for chest pain.     Objective:     Exam:  /72 (BP Location: Left arm, Patient Position: Sitting, BP Cuff Size: Adult)   Pulse 65   Temp 36.5 °C (97.7 °F) (Temporal)   Resp 18   Ht 1.549 m (5' 1\")   Wt 57.7 kg (127 lb 3.2 oz)   SpO2 98%   BMI 24.03 kg/m²  Body mass index is 24.03 kg/m².    Physical Exam  Constitutional:       Appearance: Normal appearance.   Cardiovascular:      Rate and Rhythm: Normal rate and regular rhythm.      Pulses: Normal pulses.      Heart sounds: Normal heart sounds. No murmur heard.  Pulmonary:      Effort: Pulmonary effort is normal. No respiratory distress.      Breath sounds: Normal breath sounds.   Neurological:      Mental Status: She is alert and oriented to person, place, and time.   Psychiatric:         Mood and Affect: Mood normal.       Assessment & Plan:   Leslie  is a pleasant 69 y.o. female with the following -     Problem List Items Addressed This Visit       Hypertension (Chronic)     Chronic unstable, suboptimally controlled.  We are having troubles adjusting her medications.  Add nifedipine 30 mg daily.         Relevant Medications    NIFEdipine (ADALAT CC) 30 MG CR tablet     Return in about 4 weeks (around 5/14/2024), or if symptoms worsen or fail to improve, for Follow up on HTN.    Please note that this dictation was created using voice recognition software. I have made every reasonable attempt to correct obvious errors, but I expect that there are errors of grammar and possibly content that I did not discover before finalizing the note.        "

## 2024-04-16 NOTE — ASSESSMENT & PLAN NOTE
Chronic unstable, suboptimally controlled.  We are having troubles adjusting her medications.  Add nifedipine 30 mg daily.

## 2024-04-18 LAB — TSI SER-ACNC: <0.1 IU/L

## 2024-05-01 ENCOUNTER — PHYSICAL THERAPY (OUTPATIENT)
Dept: PHYSICAL THERAPY | Facility: REHABILITATION | Age: 69
End: 2024-05-01
Attending: INTERNAL MEDICINE
Payer: COMMERCIAL

## 2024-05-01 DIAGNOSIS — M25.511 CHRONIC RIGHT SHOULDER PAIN: ICD-10-CM

## 2024-05-01 DIAGNOSIS — G89.29 CHRONIC RIGHT SHOULDER PAIN: ICD-10-CM

## 2024-05-01 NOTE — OP THERAPY DISCHARGE SUMMARY
Outpatient Physical Therapy  DISCHARGE SUMMARY NOTE      [address]  [address]  [place] [address]  Phone:  [phone]  Fax:  [phone]    Date of Visit: 05/01/2024    Patient: Leslie Tamez  YOB: 1955  MRN: [id number]     Referring Provider: Lizbeth Yost M.D.  [address]  [address]  [place] [address]   Referring Diagnosis Pain in right shoulder [M25.511];Other chronic pain [G89.29];Sciatica, right side [M54.31]         Functional Assessment Used    DASH 0% disability        Your patient is being discharged from Physical Therapy with the following comments:   Goals met    No pain limiting motion past 2 weeks and returned to gym w/o limits  OBJECTIVE:  5 knee push ups wo pain/  R   Fle:175  Yjt200  Er90 --erp  Hbbt12 tight  L: t3      ASSESSMENT:   All goals met  PLAN/RECOMMENDATIONS:   All goals met   Patient is independent with her HEP and is being discharged from therapy at this time. oscar Rebollar, PT, DPT, OCS    Date: 5/1/2024

## 2024-05-01 NOTE — OP THERAPY DAILY TREATMENT
Outpatient Physical Therapy  DAILY TREATMENT     [facility] Physical Therapy [address].  [address]  [place] [address]  Phone:  [phone]  Fax:  [phone]    Date: 05/01/2024    Patient: Leslie Tamez  YOB: 1955  MRN: 4' late        Chief Complaint: No chief complaint on file.    Visit #: 10    SUBJECTIVE:  No pain limiting motion past 2 weeks and returned to gym w/o limits  OBJECTIVE:  5 knee push ups wo pain/  R   Fle:175  Mso120  Er90 --erp  Hbbt12 tight  L: t3                    Therapeutic Treatments and Modalities:     Therapeutic Treatment and Modalities Summary: Reviewed HEP    Russian 10/10 infra/deltoid box x5' then 5/5/ mhp x 10'         Time-based treatments/modalities:           Pain rating (1-10) before treatment:0/10 at rest  Pain rating (1-10) after treatment:  0  DASH 0% disability  ASSESSMENT:   All goals met  PLAN/RECOMMENDATIONS:   All goals met   Patient is independent with her HEP and is being discharged from therapy at this time. s

## 2024-05-07 PROCEDURE — RXMED WILLOW AMBULATORY MEDICATION CHARGE: Performed by: INTERNAL MEDICINE

## 2024-05-08 ENCOUNTER — PHARMACY VISIT (OUTPATIENT)
Dept: PHARMACY | Facility: MEDICAL CENTER | Age: 69
End: 2024-05-08
Payer: COMMERCIAL

## 2024-05-14 PROCEDURE — RXMED WILLOW AMBULATORY MEDICATION CHARGE: Performed by: INTERNAL MEDICINE

## 2024-05-16 ENCOUNTER — PHARMACY VISIT (OUTPATIENT)
Dept: PHARMACY | Facility: MEDICAL CENTER | Age: 69
End: 2024-05-16
Payer: COMMERCIAL

## 2024-05-21 ENCOUNTER — OFFICE VISIT (OUTPATIENT)
Dept: MEDICAL GROUP | Facility: MEDICAL CENTER | Age: 69
End: 2024-05-21
Payer: COMMERCIAL

## 2024-05-21 VITALS
RESPIRATION RATE: 16 BRPM | HEIGHT: 61 IN | OXYGEN SATURATION: 98 % | BODY MASS INDEX: 23.94 KG/M2 | HEART RATE: 66 BPM | SYSTOLIC BLOOD PRESSURE: 122 MMHG | WEIGHT: 126.8 LBS | DIASTOLIC BLOOD PRESSURE: 68 MMHG | TEMPERATURE: 97.4 F

## 2024-05-21 DIAGNOSIS — E78.5 HYPERLIPIDEMIA, UNSPECIFIED HYPERLIPIDEMIA TYPE: Chronic | ICD-10-CM

## 2024-05-21 DIAGNOSIS — M25.572 ACUTE LEFT ANKLE PAIN: ICD-10-CM

## 2024-05-21 DIAGNOSIS — I10 PRIMARY HYPERTENSION: Chronic | ICD-10-CM

## 2024-05-21 PROCEDURE — 3074F SYST BP LT 130 MM HG: CPT | Performed by: INTERNAL MEDICINE

## 2024-05-21 PROCEDURE — 3078F DIAST BP <80 MM HG: CPT | Performed by: INTERNAL MEDICINE

## 2024-05-21 PROCEDURE — 99214 OFFICE O/P EST MOD 30 MIN: CPT | Performed by: INTERNAL MEDICINE

## 2024-05-21 ASSESSMENT — ENCOUNTER SYMPTOMS: SHORTNESS OF BREATH: 0

## 2024-05-21 ASSESSMENT — FIBROSIS 4 INDEX: FIB4 SCORE: 1.24
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
## 2024-06-10 PROCEDURE — RXMED WILLOW AMBULATORY MEDICATION CHARGE: Performed by: INTERNAL MEDICINE

## 2024-06-11 ENCOUNTER — PHARMACY VISIT (OUTPATIENT)
Dept: PHARMACY | Facility: MEDICAL CENTER | Age: 69
End: 2024-06-11
Payer: COMMERCIAL

## 2024-06-11 PROCEDURE — RXMED WILLOW AMBULATORY MEDICATION CHARGE: Performed by: OTOLARYNGOLOGY

## 2024-06-11 RX ORDER — TRIAMCINOLONE ACETONIDE 5 MG/G
OINTMENT TOPICAL
Qty: 15 G | Refills: 3 | Status: SHIPPED | OUTPATIENT
Start: 2024-06-11 | End: 2024-06-13

## 2024-06-13 ENCOUNTER — HOSPITAL ENCOUNTER (OUTPATIENT)
Dept: HEMATOLOGY ONCOLOGY | Facility: MEDICAL CENTER | Age: 69
End: 2024-06-13
Attending: NURSE PRACTITIONER
Payer: COMMERCIAL

## 2024-06-13 ENCOUNTER — TELEPHONE (OUTPATIENT)
Dept: RADIOLOGY | Facility: MEDICAL CENTER | Age: 69
End: 2024-06-13

## 2024-06-13 VITALS
BODY MASS INDEX: 23.6 KG/M2 | RESPIRATION RATE: 15 BRPM | OXYGEN SATURATION: 98 % | HEIGHT: 61 IN | WEIGHT: 125 LBS | TEMPERATURE: 98.3 F | DIASTOLIC BLOOD PRESSURE: 70 MMHG | SYSTOLIC BLOOD PRESSURE: 110 MMHG | HEART RATE: 63 BPM

## 2024-06-13 DIAGNOSIS — Z79.811 ENCOUNTER FOR MONITORING AROMATASE INHIBITOR THERAPY: ICD-10-CM

## 2024-06-13 DIAGNOSIS — D05.12 DUCTAL CARCINOMA IN SITU (DCIS) OF LEFT BREAST: ICD-10-CM

## 2024-06-13 DIAGNOSIS — Z91.89 AT HIGH RISK FOR OSTEOPOROSIS: ICD-10-CM

## 2024-06-13 DIAGNOSIS — Z13.820 SCREENING FOR OSTEOPOROSIS: ICD-10-CM

## 2024-06-13 DIAGNOSIS — Z51.81 ENCOUNTER FOR MONITORING AROMATASE INHIBITOR THERAPY: ICD-10-CM

## 2024-06-13 PROCEDURE — 99212 OFFICE O/P EST SF 10 MIN: CPT | Performed by: NURSE PRACTITIONER

## 2024-06-13 PROCEDURE — 99214 OFFICE O/P EST MOD 30 MIN: CPT | Performed by: NURSE PRACTITIONER

## 2024-06-13 RX ORDER — TRIAMCINOLONE ACETONIDE 5 MG/G
0.5 CREAM TOPICAL 3 TIMES DAILY
COMMUNITY

## 2024-06-13 ASSESSMENT — ENCOUNTER SYMPTOMS
NAUSEA: 0
DIAPHORESIS: 1
CONSTIPATION: 0
FEVER: 0
CHILLS: 0
COUGH: 0
DIZZINESS: 0
WEIGHT LOSS: 0
HEADACHES: 0
DIARRHEA: 0
PALPITATIONS: 0
MYALGIAS: 0
SHORTNESS OF BREATH: 0
VOMITING: 0

## 2024-06-13 ASSESSMENT — FIBROSIS 4 INDEX: FIB4 SCORE: 1.24

## 2024-06-13 ASSESSMENT — PAIN SCALES - GENERAL: PAINLEVEL: NO PAIN

## 2024-06-13 NOTE — TELEPHONE ENCOUNTER
Left message to have patient call [facility] at [phone] or [phone] to schedule diagnostic mammogram (order from ADRIANO Henao) and Dexa Scan. Per ADRIANO Henao patient is due for dexa after 10/17/2022.

## 2024-06-13 NOTE — ADDENDUM NOTE
Encounter addended by: Edel Cannon, Med Ass't on: 6/13/2024 11:39 AM   Actions taken: Charge Capture section accepted

## 2024-06-13 NOTE — PROGRESS NOTES
Subjective     Leslie Tamez is a 69 y.o. 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    Oncology history of presenting illness:  Leslie Tamez is a 68 y.o. female with a remote history of right DCIS and a more recent history of left-sided DCIS treated with partial mastectomy and radiation in 2020. She saw Dr. Shoemaker in [place] who started her on letrozole at that time. She is tolerating it very well with no hot flashes night sweats or musculoskeletal symptoms. She had a recent bone density test that showed normal bone density. Her last mammogram was in March 2022. She has no problems with her breast now. Other than hypertension her health is good.     Interval histories:  Interval history 12/14/2023: She has done very well over the past 6 months. Routine mammogram screening in July 2023 was normal. She tolerates letrozole without any difficulty whatsoever. No new medical problems.     Interval history 06/13/24 ([place], APRN):  Patient is doing very well.  She does note hot flash every once in a while but not bothersome.  She is tolerating letrozole with no difficulties.  She did recently undergo thyroid biopsy which was negative.    Treatment history:  End of 2019: Partial mastectomy - [place], [place]   March 2020: XRT - [place]  Around April 2020: Initiation of Letrozole - Dr. Shoemaker in [place], [place]      Allergies   Allergen Reactions    Sulfa Drugs Hives and Rash    Hydrocodone Vomiting and Nausea     Vicodin    Cephalexin     Codeine Unspecified     pt felt disoriented    Hydrocodone-Acetaminophen Vomiting    Septra [Sulfamethoxazole W-Trimethoprim] Hives    Latex Rash      Rash  Other reaction(s): Rash  rash       Current Outpatient Medications on File Prior to Encounter   Medication Sig Dispense Refill    NIFEdipine (ADALAT CC) 30 MG CR tablet Take 1 Tablet by mouth every day. 90 Tablet 1    losartan (COZAAR) 100 MG Tab Take 1 Tablet by mouth every day. 90 Tablet 1    Misc Natural Products (OSTEO BI-FLEX TRIPLE STRENGTH PO) Take  by mouth.      CALCIUM PO Take  by mouth.      atenolol (TENORMIN) 100 MG Tab Take 1 Tablet by mouth every day. 90 Tablet 3    letrozole (FEMARA) 2.5 MG Tab Take 1 Tablet by mouth every day. 90 Tablet 3    Omega-3 Fatty Acids (FISH OIL) 1000 MG Cap capsule Take 1,000 mg by mouth every day.      cyanocobalamin (VITAMIN B12) 1000 MCG Tab Take 1,000 mcg by mouth every day.      triamcinolone (ARISTOCORT) 0.5 % ointment Apply 1 application to affected area Nasally Twice a day for 14 days (Patient not taking: Reported on 6/13/2024) 15 g 3    azelastine (ASTELIN) 137 MCG/SPRAY nasal spray Instill 2 sprays in each nostril twice a day (Patient not taking: Reported on 6/13/2024) 30 mL 4    fluticasone (FLONASE) 50 MCG/ACT nasal spray Instill 1 spray in each nostril once a day for 90 days (Patient not taking: Reported on 6/13/2024) 16 g 4    montelukast (SINGULAIR) 10 MG Tab Take 1 tablet by mouth once a day (Patient not taking: Reported on 6/13/2024) 90 Tablet 0    olopatadine (PATANOL) 0.1 % ophthalmic solution Instill 1 drop into affected eye twice a day (Patient not taking: Reported on 6/13/2024) 5 mL 3     No current facility-administered medications on file prior to encounter.       Review of Systems   Constitutional:  Positive for diaphoresis (once in a while she will get a hot flash but tolerable). Negative for chills, fever, malaise/fatigue and weight loss.   Respiratory:  Negative for cough and shortness of breath.    Cardiovascular:  Negative for chest pain and palpitations.   Gastrointestinal:  Negative for constipation, diarrhea, nausea and vomiting.  "  Genitourinary:  Negative for dysuria.   Musculoskeletal:  Negative for myalgias.   Neurological:  Negative for dizziness and headaches.              Objective     /70 (BP Location: Right arm, Patient Position: Sitting, BP Cuff Size: Adult)   Pulse 63   Temp 36.8 °C (98.3 °F) (Temporal)   Resp 15   Ht 1.549 m (5' 1\")   Wt 56.7 kg (125 lb)   SpO2 98%   BMI 23.62 kg/m²      Physical Exam  Vitals reviewed.   Constitutional:       General: She is not in acute distress.     Appearance: Normal appearance. She is not diaphoretic.   Cardiovascular:      Rate and Rhythm: Normal rate and regular rhythm.      Heart sounds: Normal heart sounds. No murmur heard.     No friction rub. No gallop.   Pulmonary:      Effort: Pulmonary effort is normal. No respiratory distress.      Breath sounds: Normal breath sounds. No wheezing.   Chest:   Breasts:     Right: No swelling, bleeding, inverted nipple, mass, nipple discharge, skin change or tenderness.      Left: No swelling, bleeding, inverted nipple, mass, nipple discharge, skin change or tenderness.   Abdominal:      General: Bowel sounds are normal. There is no distension.      Palpations: Abdomen is soft.      Tenderness: There is no abdominal tenderness.   Musculoskeletal:         General: No swelling or tenderness. Normal range of motion.   Lymphadenopathy:      Upper Body:      Right upper body: No supraclavicular or axillary adenopathy.      Left upper body: No supraclavicular or axillary adenopathy.   Skin:     General: Skin is warm and dry.   Neurological:      Mental Status: She is alert and oriented to person, place, and time.   Psychiatric:         Mood and Affect: Mood normal.         Behavior: Behavior normal.                   Assessment & Plan         1. Ductal carcinoma in situ (DCIS) of left breast  MA-DIAGNOSTIC MAMMO BILAT W/TOMOSYNTHESIS W/CAD    DS-BONE DENSITY STUDY (DEXA)      2. Encounter for monitoring aromatase inhibitor therapy        3. " Screening for osteoporosis  DS-BONE DENSITY STUDY (DEXA)      4. At high risk for osteoporosis  DS-BONE DENSITY STUDY (DEXA)              Impression:  1. History of bilateral DCIS status postlumpectomy and radiation therapy on chemoprophylaxis with letrozole since 2020.   2. At risk for osteoporosis - Last DEXA scan completed 10/17/2022 which showed osteopenia. Patient will be due for next DEXA scan mid-October 2024. Highly encouraged vitamin D and calcium supplements.       Plan:  Patient to continue with letrozole as planned.  Annual mammogram has been ordered and will be scheduled for next month.  Bone density test has been ordered and scheduled for mid October.    Patient to return to the clinic in 6 months, or sooner if needed.      Please note that this dictation was created using voice recognition software. I have made every reasonable attempt to correct obvious errors, but I expect that there are errors of grammar and possibly content that I did not discover before finalizing the note.

## 2024-06-24 ENCOUNTER — APPOINTMENT (OUTPATIENT)
Dept: MEDICAL GROUP | Facility: MEDICAL CENTER | Age: 69
End: 2024-06-24
Payer: COMMERCIAL

## 2024-07-09 ENCOUNTER — HOSPITAL ENCOUNTER (OUTPATIENT)
Dept: RADIOLOGY | Facility: MEDICAL CENTER | Age: 69
End: 2024-07-09
Attending: NURSE PRACTITIONER
Payer: COMMERCIAL

## 2024-07-09 DIAGNOSIS — D05.12 DUCTAL CARCINOMA IN SITU (DCIS) OF LEFT BREAST: ICD-10-CM

## 2024-07-09 PROCEDURE — G0279 TOMOSYNTHESIS, MAMMO: HCPCS

## 2024-07-10 ENCOUNTER — TELEPHONE (OUTPATIENT)
Dept: HEMATOLOGY ONCOLOGY | Facility: MEDICAL CENTER | Age: 69
End: 2024-07-10
Payer: COMMERCIAL

## 2024-07-11 PROCEDURE — RXMED WILLOW AMBULATORY MEDICATION CHARGE: Performed by: INTERNAL MEDICINE

## 2024-07-12 ENCOUNTER — PHARMACY VISIT (OUTPATIENT)
Dept: PHARMACY | Facility: MEDICAL CENTER | Age: 69
End: 2024-07-12
Payer: COMMERCIAL

## 2024-08-07 PROCEDURE — RXMED WILLOW AMBULATORY MEDICATION CHARGE: Performed by: INTERNAL MEDICINE

## 2024-08-08 ENCOUNTER — HOSPITAL ENCOUNTER (OUTPATIENT)
Dept: LAB | Facility: MEDICAL CENTER | Age: 69
End: 2024-08-08
Attending: INTERNAL MEDICINE
Payer: COMMERCIAL

## 2024-08-08 ENCOUNTER — PHARMACY VISIT (OUTPATIENT)
Dept: PHARMACY | Facility: MEDICAL CENTER | Age: 69
End: 2024-08-08
Payer: COMMERCIAL

## 2024-08-08 DIAGNOSIS — I10 PRIMARY HYPERTENSION: Chronic | ICD-10-CM

## 2024-08-08 DIAGNOSIS — D05.12 DUCTAL CARCINOMA IN SITU (DCIS) OF LEFT BREAST: ICD-10-CM

## 2024-08-08 LAB
25(OH)D3 SERPL-MCNC: 44 NG/ML (ref 30–100)
CHOLEST SERPL-MCNC: 249 MG/DL (ref 100–199)
HDLC SERPL-MCNC: 43 MG/DL
LDLC SERPL CALC-MCNC: 156 MG/DL
T3FREE SERPL-MCNC: 4.05 PG/ML (ref 2–4.4)
T4 FREE SERPL-MCNC: 1.13 NG/DL (ref 0.93–1.7)
TRIGL SERPL-MCNC: 250 MG/DL (ref 0–149)
TSH SERPL-ACNC: 0.76 UIU/ML (ref 0.35–5.5)
VIT B12 SERPL-MCNC: 1117 PG/ML (ref 211–911)

## 2024-08-08 PROCEDURE — 80061 LIPID PANEL: CPT

## 2024-08-08 PROCEDURE — RXMED WILLOW AMBULATORY MEDICATION CHARGE: Performed by: INTERNAL MEDICINE

## 2024-08-08 PROCEDURE — 36415 COLL VENOUS BLD VENIPUNCTURE: CPT

## 2024-08-08 PROCEDURE — 84443 ASSAY THYROID STIM HORMONE: CPT

## 2024-08-08 PROCEDURE — 82607 VITAMIN B-12: CPT

## 2024-08-08 PROCEDURE — 82306 VITAMIN D 25 HYDROXY: CPT

## 2024-08-08 PROCEDURE — 84481 FREE ASSAY (FT-3): CPT

## 2024-08-08 PROCEDURE — 84439 ASSAY OF FREE THYROXINE: CPT

## 2024-08-08 RX ORDER — LETROZOLE 2.5 MG/1
2.5 TABLET, FILM COATED ORAL DAILY
Qty: 90 TABLET | Refills: 1 | Status: SHIPPED | OUTPATIENT
Start: 2024-08-08

## 2024-08-13 ENCOUNTER — PHARMACY VISIT (OUTPATIENT)
Dept: PHARMACY | Facility: MEDICAL CENTER | Age: 69
End: 2024-08-13
Payer: COMMERCIAL

## 2024-08-13 ENCOUNTER — APPOINTMENT (OUTPATIENT)
Dept: MEDICAL GROUP | Facility: MEDICAL CENTER | Age: 69
End: 2024-08-13
Payer: COMMERCIAL

## 2024-08-13 VITALS
BODY MASS INDEX: 23.15 KG/M2 | HEIGHT: 62 IN | DIASTOLIC BLOOD PRESSURE: 70 MMHG | OXYGEN SATURATION: 98 % | WEIGHT: 125.8 LBS | SYSTOLIC BLOOD PRESSURE: 120 MMHG | HEART RATE: 60 BPM

## 2024-08-13 DIAGNOSIS — E78.5 HYPERLIPIDEMIA, UNSPECIFIED HYPERLIPIDEMIA TYPE: Chronic | ICD-10-CM

## 2024-08-13 DIAGNOSIS — I10 PRIMARY HYPERTENSION: ICD-10-CM

## 2024-08-13 DIAGNOSIS — R73.03 PREDIABETES: Chronic | ICD-10-CM

## 2024-08-13 PROCEDURE — 99214 OFFICE O/P EST MOD 30 MIN: CPT | Performed by: INTERNAL MEDICINE

## 2024-08-13 PROCEDURE — RXMED WILLOW AMBULATORY MEDICATION CHARGE: Performed by: INTERNAL MEDICINE

## 2024-08-13 PROCEDURE — 3074F SYST BP LT 130 MM HG: CPT | Performed by: INTERNAL MEDICINE

## 2024-08-13 PROCEDURE — 3078F DIAST BP <80 MM HG: CPT | Performed by: INTERNAL MEDICINE

## 2024-08-13 RX ORDER — ROSUVASTATIN CALCIUM 5 MG/1
5 TABLET, COATED ORAL EVERY EVENING
Qty: 90 TABLET | Refills: 3 | Status: SHIPPED | OUTPATIENT
Start: 2024-08-13

## 2024-08-13 RX ORDER — NIFEDIPINE 30 MG
30 TABLET, EXTENDED RELEASE ORAL DAILY
Qty: 90 TABLET | Refills: 1 | Status: SHIPPED | OUTPATIENT
Start: 2024-08-13

## 2024-08-13 RX ORDER — ATENOLOL 100 MG/1
100 TABLET ORAL DAILY
Qty: 90 TABLET | Refills: 3 | Status: SHIPPED | OUTPATIENT
Start: 2024-08-13

## 2024-08-13 RX ORDER — LOSARTAN POTASSIUM 100 MG/1
100 TABLET ORAL DAILY
Qty: 90 TABLET | Refills: 1 | Status: SHIPPED | OUTPATIENT
Start: 2024-08-13

## 2024-08-13 ASSESSMENT — FIBROSIS 4 INDEX: FIB4 SCORE: 1.24

## 2024-08-13 NOTE — PROGRESS NOTES
CC:   Chief Complaint   Patient presents with    Follow-Up    Medication Refill     Atenolol and losartan     Diagnoses of Primary hypertension, Hyperlipidemia, unspecified hyperlipidemia type, and Prediabetes were pertinent to this visit.  Verbal consent was acquired by the patient to use [employer] ambient listening note generation during this visit Yes     History of Present Illness  Leslie is a pleasant 69 y.o. female who presents for evaluation of cholesterol.    He has previously NOT been prescribed cholesterol medications. His lifestyle includes regular gym workouts five days a week.    Lab Results   Component Value Date/Time    CHOLSTRLTOT 249 (H) 2024 09:35 AM     (H) 2024 09:35 AM    HDL 43 2024 09:35 AM    TRIGLYCERIDE 250 (H) 2024 09:35 AM       Lab Results   Component Value Date/Time    SODIUM 142 2023 11:00 AM    POTASSIUM 4.0 2023 11:00 AM    CHLORIDE 103 2023 11:00 AM    CO2 25 2023 11:00 AM    GLUCOSE 107 (H) 2023 11:00 AM    BUN 19 2023 11:00 AM    CREATININE 0.93 2023 11:00 AM     Lab Results   Component Value Date/Time    ALKPHOSPHAT 117 (H) 2023 11:00 AM    ASTSGOT 18 2023 11:00 AM    ALTSGPT 10 2023 11:00 AM    TBILIRUBIN 0.5 2023 11:00 AM        FAMILY HISTORY  He has a family history of heart attack on his mother's side. His aunt had heart problem in her 70s. His maternal grandfather  in his 80s from heart attack.    Past Medical History:   Diagnosis Date    DCIS (ductal carcinoma in situ)     Hypertension     Prediabetes      Current Outpatient Medications Ordered in Epic   Medication Sig Dispense Refill    rosuvastatin (CRESTOR) 5 MG Tab Take 1 Tablet by mouth every evening. 90 Tablet 3    losartan (COZAAR) 100 MG Tab Take 1 Tablet by mouth every day. 90 Tablet 1    atenolol (TENORMIN) 100 MG Tab Take 1 Tablet by mouth every day. 90 Tablet 3    NIFEdipine (ADALAT CC) 30 MG CR tablet Take 1  "Tablet by mouth every day. 90 Tablet 1    letrozole (FEMARA) 2.5 MG Tab Take 1 Tablet by mouth every day. 90 Tablet 1    triamcinolone acetonide (KENALOG) 0.5 % Cream Apply 0.5 g topically 3 times a day.      Misc Natural Products (OSTEO BI-FLEX TRIPLE STRENGTH PO) Take  by mouth.      CALCIUM PO Take  by mouth.      Omega-3 Fatty Acids (FISH OIL) 1000 MG Cap capsule Take 1,000 mg by mouth every day.      cyanocobalamin (VITAMIN B12) 1000 MCG Tab Take 1,000 mcg by mouth every day.       No current [facility]-ordered facility-administered medications on file.     Review of Systems   All other systems reviewed and are negative.    Objective:     Exam:  /70   Pulse 60   Ht 1.575 m (5' 2\")   Wt 57.1 kg (125 lb 12.8 oz)   SpO2 98%   BMI 23.01 kg/m²  Body mass index is 23.01 kg/m².    Physical Exam  Constitutional:       Appearance: Normal appearance.   HENT:      Head: Normocephalic and atraumatic.   Pulmonary:      Effort: Pulmonary effort is normal.   Neurological:      General: No focal deficit present.      Mental Status: She is alert and oriented to person, place, and time.   Psychiatric:         Mood and Affect: Mood normal.         Thought Content: Thought content normal.         Judgment: Judgment normal.       Assessment & Plan:   Leslie  is a pleasant 69 y.o. female with the following -   Assessment & Plan  1. Hyperlipidemia.  This is a chronic problem. It is unstable. The patient has optimized her diet. She is exercising at least 5 days a week. Her diet is clean. She is at elevated 10-year ASCVD risk of 10.5 percent. She is open to starting cholesterol medication, start Crestor 5 mg daily, reassess lipid panel in 3 months.    2. Prediabetes.  This is a chronic problem. It is managed with lifestyle measures. It was diagnosed in 11/2022. She is on a low-carb diet and exercising regularly. I will recheck her A1c in 3 months.    3. Hypertension.  This is a chronic problem. It is controlled on atenolol 100 mg " daily, losartan 100 mg daily, and nifedipine 30 mg daily. She will continue current regimen without changes.    Problem List Items Addressed This Visit       Hyperlipidemia (Chronic)    Relevant Medications    rosuvastatin (CRESTOR) 5 MG Tab    losartan (COZAAR) 100 MG Tab    atenolol (TENORMIN) 100 MG Tab    NIFEdipine (ADALAT CC) 30 MG CR tablet    Other Relevant Orders    Lipid Profile    Hypertension (Chronic)    Relevant Medications    rosuvastatin (CRESTOR) 5 MG Tab    losartan (COZAAR) 100 MG Tab    atenolol (TENORMIN) 100 MG Tab    NIFEdipine (ADALAT CC) 30 MG CR tablet    Other Relevant Orders    Comp Metabolic Panel    CBC WITH DIFFERENTIAL    Prediabetes (Chronic)    Relevant Orders    HEMOGLOBIN A1C     Return in about 3 months (around 11/13/2024), or if symptoms worsen or fail to improve, for follow up on lab results.    Please note that this dictation was created using voice recognition software. I have made every reasonable attempt to correct obvious errors, but I expect that there are errors of grammar and possibly content that I did not discover before finalizing the note.

## 2024-09-06 ENCOUNTER — PHARMACY VISIT (OUTPATIENT)
Dept: PHARMACY | Facility: MEDICAL CENTER | Age: 69
End: 2024-09-06
Payer: COMMERCIAL

## 2024-09-06 PROCEDURE — RXMED WILLOW AMBULATORY MEDICATION CHARGE: Performed by: INTERNAL MEDICINE

## 2024-10-09 PROCEDURE — RXMED WILLOW AMBULATORY MEDICATION CHARGE: Performed by: INTERNAL MEDICINE

## 2024-10-10 ENCOUNTER — PHARMACY VISIT (OUTPATIENT)
Dept: PHARMACY | Facility: MEDICAL CENTER | Age: 69
End: 2024-10-10
Payer: COMMERCIAL

## 2024-10-21 ENCOUNTER — HOSPITAL ENCOUNTER (OUTPATIENT)
Dept: RADIOLOGY | Facility: MEDICAL CENTER | Age: 69
End: 2024-10-21
Attending: NURSE PRACTITIONER
Payer: COMMERCIAL

## 2024-10-21 DIAGNOSIS — Z13.820 SCREENING FOR OSTEOPOROSIS: ICD-10-CM

## 2024-10-21 DIAGNOSIS — D05.12 DUCTAL CARCINOMA IN SITU (DCIS) OF LEFT BREAST: ICD-10-CM

## 2024-10-21 DIAGNOSIS — Z91.89 AT HIGH RISK FOR OSTEOPOROSIS: ICD-10-CM

## 2024-10-21 PROCEDURE — 77080 DXA BONE DENSITY AXIAL: CPT

## 2024-10-22 ENCOUNTER — TELEPHONE (OUTPATIENT)
Dept: HEMATOLOGY ONCOLOGY | Facility: MEDICAL CENTER | Age: 69
End: 2024-10-22
Payer: COMMERCIAL

## 2024-11-07 PROCEDURE — RXMED WILLOW AMBULATORY MEDICATION CHARGE: Performed by: INTERNAL MEDICINE

## 2024-11-08 ENCOUNTER — PHARMACY VISIT (OUTPATIENT)
Dept: PHARMACY | Facility: MEDICAL CENTER | Age: 69
End: 2024-11-08
Payer: COMMERCIAL

## 2024-11-08 ENCOUNTER — HOSPITAL ENCOUNTER (OUTPATIENT)
Dept: LAB | Facility: MEDICAL CENTER | Age: 69
End: 2024-11-08
Attending: INTERNAL MEDICINE
Payer: COMMERCIAL

## 2024-11-08 DIAGNOSIS — R73.03 PREDIABETES: Chronic | ICD-10-CM

## 2024-11-08 DIAGNOSIS — I10 PRIMARY HYPERTENSION: ICD-10-CM

## 2024-11-08 DIAGNOSIS — E78.5 HYPERLIPIDEMIA, UNSPECIFIED HYPERLIPIDEMIA TYPE: Chronic | ICD-10-CM

## 2024-11-08 LAB
ALBUMIN SERPL BCP-MCNC: 4.7 G/DL (ref 3.2–4.9)
ALBUMIN/GLOB SERPL: 1.3 G/DL
ALP SERPL-CCNC: 116 U/L (ref 30–99)
ALT SERPL-CCNC: 8 U/L (ref 2–50)
ANION GAP SERPL CALC-SCNC: 12 MMOL/L (ref 7–16)
AST SERPL-CCNC: 20 U/L (ref 12–45)
BASOPHILS # BLD AUTO: 0.9 % (ref 0–1.8)
BASOPHILS # BLD: 0.07 K/UL (ref 0–0.12)
BILIRUB SERPL-MCNC: 0.6 MG/DL (ref 0.1–1.5)
BUN SERPL-MCNC: 22 MG/DL (ref 8–22)
CALCIUM ALBUM COR SERPL-MCNC: 9.8 MG/DL (ref 8.5–10.5)
CALCIUM SERPL-MCNC: 10.4 MG/DL (ref 8.5–10.5)
CHLORIDE SERPL-SCNC: 105 MMOL/L (ref 96–112)
CHOLEST SERPL-MCNC: 153 MG/DL (ref 100–199)
CO2 SERPL-SCNC: 24 MMOL/L (ref 20–33)
CREAT SERPL-MCNC: 1.2 MG/DL (ref 0.5–1.4)
EOSINOPHIL # BLD AUTO: 0.16 K/UL (ref 0–0.51)
EOSINOPHIL NFR BLD: 2 % (ref 0–6.9)
ERYTHROCYTE [DISTWIDTH] IN BLOOD BY AUTOMATED COUNT: 41.1 FL (ref 35.9–50)
EST. AVERAGE GLUCOSE BLD GHB EST-MCNC: 126 MG/DL
GFR SERPLBLD CREATININE-BSD FMLA CKD-EPI: 49 ML/MIN/1.73 M 2
GLOBULIN SER CALC-MCNC: 3.6 G/DL (ref 1.9–3.5)
GLUCOSE SERPL-MCNC: 104 MG/DL (ref 65–99)
HBA1C MFR BLD: 6 % (ref 4–5.6)
HCT VFR BLD AUTO: 38.8 % (ref 37–47)
HDLC SERPL-MCNC: 42 MG/DL
HGB BLD-MCNC: 12.8 G/DL (ref 12–16)
IMM GRANULOCYTES # BLD AUTO: 0.03 K/UL (ref 0–0.11)
IMM GRANULOCYTES NFR BLD AUTO: 0.4 % (ref 0–0.9)
LDLC SERPL CALC-MCNC: 82 MG/DL
LYMPHOCYTES # BLD AUTO: 2.6 K/UL (ref 1–4.8)
LYMPHOCYTES NFR BLD: 32.9 % (ref 22–41)
MCH RBC QN AUTO: 28.8 PG (ref 27–33)
MCHC RBC AUTO-ENTMCNC: 33 G/DL (ref 32.2–35.5)
MCV RBC AUTO: 87.2 FL (ref 81.4–97.8)
MONOCYTES # BLD AUTO: 0.48 K/UL (ref 0–0.85)
MONOCYTES NFR BLD AUTO: 6.1 % (ref 0–13.4)
NEUTROPHILS # BLD AUTO: 4.56 K/UL (ref 1.82–7.42)
NEUTROPHILS NFR BLD: 57.7 % (ref 44–72)
NRBC # BLD AUTO: 0 K/UL
NRBC BLD-RTO: 0 /100 WBC (ref 0–0.2)
PLATELET # BLD AUTO: 334 K/UL (ref 164–446)
PMV BLD AUTO: 11.3 FL (ref 9–12.9)
POTASSIUM SERPL-SCNC: 4.9 MMOL/L (ref 3.6–5.5)
PROT SERPL-MCNC: 8.3 G/DL (ref 6–8.2)
RBC # BLD AUTO: 4.45 M/UL (ref 4.2–5.4)
SODIUM SERPL-SCNC: 141 MMOL/L (ref 135–145)
TRIGL SERPL-MCNC: 143 MG/DL (ref 0–149)
WBC # BLD AUTO: 7.9 K/UL (ref 4.8–10.8)

## 2024-11-08 PROCEDURE — 80053 COMPREHEN METABOLIC PANEL: CPT

## 2024-11-08 PROCEDURE — 36415 COLL VENOUS BLD VENIPUNCTURE: CPT

## 2024-11-08 PROCEDURE — 83036 HEMOGLOBIN GLYCOSYLATED A1C: CPT

## 2024-11-08 PROCEDURE — 80061 LIPID PANEL: CPT

## 2024-11-08 PROCEDURE — 85025 COMPLETE CBC W/AUTO DIFF WBC: CPT

## 2024-11-13 ENCOUNTER — OFFICE VISIT (OUTPATIENT)
Dept: MEDICAL GROUP | Facility: MEDICAL CENTER | Age: 69
End: 2024-11-13
Payer: COMMERCIAL

## 2024-11-13 VITALS
HEART RATE: 65 BPM | WEIGHT: 128 LBS | OXYGEN SATURATION: 97 % | HEIGHT: 62 IN | DIASTOLIC BLOOD PRESSURE: 72 MMHG | SYSTOLIC BLOOD PRESSURE: 120 MMHG | BODY MASS INDEX: 23.55 KG/M2

## 2024-11-13 DIAGNOSIS — Z23 NEED FOR VACCINATION: ICD-10-CM

## 2024-11-13 DIAGNOSIS — I10 PRIMARY HYPERTENSION: ICD-10-CM

## 2024-11-13 DIAGNOSIS — R73.03 PREDIABETES: ICD-10-CM

## 2024-11-13 PROCEDURE — 3074F SYST BP LT 130 MM HG: CPT | Performed by: INTERNAL MEDICINE

## 2024-11-13 PROCEDURE — 99214 OFFICE O/P EST MOD 30 MIN: CPT | Mod: 25 | Performed by: INTERNAL MEDICINE

## 2024-11-13 PROCEDURE — 90471 IMMUNIZATION ADMIN: CPT

## 2024-11-13 PROCEDURE — 90662 IIV NO PRSV INCREASED AG IM: CPT

## 2024-11-13 PROCEDURE — 3078F DIAST BP <80 MM HG: CPT | Performed by: INTERNAL MEDICINE

## 2024-11-13 ASSESSMENT — FIBROSIS 4 INDEX: FIB4 SCORE: 1.44

## 2024-11-13 NOTE — PROGRESS NOTES
CC:   Chief Complaint   Patient presents with    Lab Results    Unable to Sleep    Arthritis     Diagnoses of Primary hypertension, Need for vaccination, and Prediabetes were pertinent to this visit.  Verbal consent was acquired by the patient to use [employer] ambient listening note generation during this visit Yes     History of Present Illness  Leslie is a pleasant 69-year-old female with a history of hypertension presenting for the results of her blood tests.    Her cholesterol levels have improved dramatically from 3 months ago. However, her A1c has slightly increased from 5.8 percent to 6.0 percent. She has been making efforts to improve her diet and has been adhering to the prescribed medication regimen. She reports no side effects from the medication.    She mentions a decrease in her water intake compared to previous habits, although she consumes a significant amount during her gym sessions. She drinks water during the night whenever she wakes up. She has experienced fatigue for a few days.    She occasionally struggles with falling asleep and is considering trying tea as a potential solution. She is also contemplating adjusting her gym schedule, which currently falls between 11:00 AM and 1:00 PM. She recalls that when she used to go to the gym at 5:00 PM, she did not have any sleep issues.    She is asking for some recommendations for ointment for arthritis. She has arthritis in her hands and experiences pain when washing dishes or crocheting.    IMMUNIZATIONS  She had her COVID-19 vaccine in 11/2021.    Past Medical History:   Diagnosis Date    DCIS (ductal carcinoma in situ)     Hypertension     Prediabetes        Current Outpatient Medications Ordered in Epic   Medication Sig Dispense Refill    rosuvastatin (CRESTOR) 5 MG Tab Take 1 Tablet by mouth every evening. 90 Tablet 3    losartan (COZAAR) 100 MG Tab Take 1 Tablet by mouth every day. 90 Tablet 1    atenolol (TENORMIN) 100 MG Tab Take 1 Tablet by  "mouth every day. 90 Tablet 3    NIFEdipine (ADALAT CC) 30 MG CR tablet Take 1 Tablet by mouth every day. 90 Tablet 1    letrozole (FEMARA) 2.5 MG Tab Take 1 Tablet by mouth every day. 90 Tablet 1    triamcinolone acetonide (KENALOG) 0.5 % Cream Apply 0.5 g topically 3 times a day.      Misc Natural Products (OSTEO BI-FLEX TRIPLE STRENGTH PO) Take  by mouth.      CALCIUM PO Take  by mouth.      Omega-3 Fatty Acids (FISH OIL) 1000 MG Cap capsule Take 1,000 mg by mouth every day.      cyanocobalamin (VITAMIN B12) 1000 MCG Tab Take 1,000 mcg by mouth every day.       No current [facility]-ordered facility-administered medications on file.     Review of Systems   All other systems reviewed and are negative.    Objective:     Exam:  /72 (BP Location: Left arm, Patient Position: Sitting, BP Cuff Size: Small adult)   Pulse 65   Ht 1.575 m (5' 2\")   Wt 58.1 kg (128 lb)   SpO2 97%   BMI 23.41 kg/m²  Body mass index is 23.41 kg/m².    Physical Exam  Constitutional:       Appearance: Normal appearance.   HENT:      Head: Normocephalic and atraumatic.   Pulmonary:      Effort: Pulmonary effort is normal. No respiratory distress.   Neurological:      Mental Status: She is alert.   Psychiatric:         Mood and Affect: Mood normal.         Behavior: Behavior normal.         Thought Content: Thought content normal.         Judgment: Judgment normal.       Results:  Results  Laboratory Studies  A1c increased from 5.8% to 6.0%. Cholesterol levels have improved.    Assessment & Plan:   Leslie  is a pleasant 69 y.o. female with the following -   Assessment & Plan  1. Prediabetes.  Her A1c levels have shown a slight increase, fluctuating around 6, 5.9, 6, 5.8, 6. She was advised to limit her intake of carbohydrates and added sugars. A repeat A1c test will be ordered in 6 months to monitor her blood sugar levels.    2. Hyperlipidemia.  Her cholesterol levels have shown significant improvement. She was advised to continue her current " cholesterol medication regimen rosuvastatin 5 mg daily. A repeat cholesterol test will be ordered in 6 months.    3. Insomnia.  She was advised to try Sleepytime tea and melatonin, starting with a low dose of 2 mg and increasing to 5 mg as needed. She was also advised to avoid screen time 2 hours before bedtime, take a 10-minute walk before bed, and consider a bath or shower with essential oils like lavender.     4. Hypertension.  Her blood pressure readings are within the normal range. She was advised to continue her current medication regimen, which includes atenolol 100 mg, losartan 100 mg, and nifedipine 30 mg daily.     5.  Decreased GFR:  Likely secondary to dehydration.  Hydration, repeat CMP.       Follow-up  She will follow up in 1 year.    Problem List Items Addressed This Visit       Hypertension (Chronic)    Relevant Orders    Comp Metabolic Panel    Lipid Profile    Prediabetes (Chronic)    Relevant Orders    HEMOGLOBIN A1C     Other Visit Diagnoses       Need for vaccination        Relevant Orders    INFLUENZA VACCINE, HIGH DOSE (65+ ONLY) (Completed)          Return in about 6 months (around 5/13/2025). Labs fv     Please note that this dictation was created using voice recognition software. I have made every reasonable attempt to correct obvious errors, but I expect that there are errors of grammar and possibly content that I did not discover before finalizing the note.

## 2024-12-09 ENCOUNTER — PHARMACY VISIT (OUTPATIENT)
Dept: PHARMACY | Facility: MEDICAL CENTER | Age: 69
End: 2024-12-09
Payer: COMMERCIAL

## 2024-12-09 PROCEDURE — RXMED WILLOW AMBULATORY MEDICATION CHARGE: Performed by: INTERNAL MEDICINE

## 2024-12-12 ASSESSMENT — ENCOUNTER SYMPTOMS
COUGH: 0
CHILLS: 0
FEVER: 0
DIZZINESS: 0
NAUSEA: 0
SHORTNESS OF BREATH: 0
PALPITATIONS: 0
WEIGHT LOSS: 0
HEADACHES: 0
DIAPHORESIS: 1
DIARRHEA: 0
MYALGIAS: 0
CONSTIPATION: 0
VOMITING: 0

## 2024-12-12 NOTE — PROGRESS NOTES
Subjective     Leslie Tamez is a 69 y.o. female who presents for continued management of DCIS.        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    Oncology history of presenting illness:  Leslie Tamez is a 68 y.o. female with a remote history of right DCIS and a more recent history of left-sided DCIS treated with partial mastectomy and radiation in 2020. She saw Dr. Shoemaker in [place] who started her on letrozole at that time. She is tolerating it very well with no hot flashes night sweats or musculoskeletal symptoms. She had a recent bone density test that showed normal bone density. Her last mammogram was in March 2022. She has no problems with her breast now. Other than hypertension her health is good.     Interval histories:  Interval history 12/14/2023: She has done very well over the past 6 months. Routine mammogram screening in July 2023 was normal. She tolerates letrozole without any difficulty whatsoever. No new medical problems.     Interval history 06/13/24 (Efrensop, APRN):  Patient is doing very well.  She does note hot flash every once in a while but not bothersome.  She is tolerating letrozole with no difficulties.  She did recently undergo thyroid biopsy which was negative.    Interval history 12/17/2024 (Taty Coronado, AOCNP): Patient returns to clinic today for follow-up appointment.      Treatment history:  End of 2019: Partial mastectomy - [place], [place]   March 2020: XRT - [place], [place]  Around April 2020: Initiation of Letrozole - Dr. Shoemaker in [place], [place]      Review of Systems   Constitutional:  Positive for diaphoresis (once in a while she will get a hot flash but tolerable). Negative for chills, fever, malaise/fatigue and weight loss.    Respiratory:  Negative for cough and shortness of breath.    Cardiovascular:  Negative for chest pain and palpitations.   Gastrointestinal:  Negative for constipation, diarrhea, nausea and vomiting.   Genitourinary:  Negative for dysuria.   Musculoskeletal:  Negative for myalgias.   Neurological:  Negative for dizziness and headaches.     PAST MEDICAL HISTORY:   No past medical history on file.    PAST SURGICAL HISTORY:   Past Surgical History:   Procedure Laterality Date    COLONOSCOPY   Colonoscopy (Fiberoptic) - YES     SOCIAL HISTORY:   reports that she has never smoked. She has never used smokeless tobacco.     FAMILY HISTORY:   Problem Relation Age of Onset    Prostate cancer Father   Family history of malignant neoplasm of prostate    Other (see comment) Other   Family history of Urologic Disorder Description: -- Mom possibly with blood in the urine    Hypertension Father   Family history of hypertension    High cholesterol Father   Family history of High cholesterol    Colon cancer Father   Family history of Colon cancer       Allergies   Allergen Reactions    Sulfa Drugs Hives and Rash    Hydrocodone Vomiting and Nausea     Vicodin    Cephalexin     Codeine Unspecified     pt felt disoriented    Hydrocodone-Acetaminophen Vomiting    Septra [Sulfamethoxazole W-Trimethoprim] Hives    Latex Rash     Rash  Other reaction(s): Rash  rash     Current Outpatient Medications on File Prior to Encounter   Medication Sig Dispense Refill    rosuvastatin (CRESTOR) 5 MG Tab Take 1 Tablet by mouth every evening. 90 Tablet 3    losartan (COZAAR) 100 MG Tab Take 1 Tablet by mouth every day. 90 Tablet 1    atenolol (TENORMIN) 100 MG Tab Take 1 Tablet by mouth every day. 90 Tablet 3    NIFEdipine (ADALAT CC) 30 MG CR tablet Take 1 Tablet by mouth every day. 90 Tablet 1    letrozole (FEMARA) 2.5 MG Tab Take 1 Tablet by mouth every day. 90 Tablet 1    triamcinolone acetonide (KENALOG) 0.5 % Cream Apply 0.5 g topically 3 times  a day.      Misc Natural Products (OSTEO BI-FLEX TRIPLE STRENGTH PO) Take  by mouth.      CALCIUM PO Take  by mouth.      Omega-3 Fatty Acids (FISH OIL) 1000 MG Cap capsule Take 1,000 mg by mouth every day.      cyanocobalamin (VITAMIN B12) 1000 MCG Tab Take 1,000 mcg by mouth every day.       No current facility-administered medications on file prior to encounter.           Objective     There were no vitals taken for this visit.     Physical Exam  Vitals reviewed.   Constitutional:       General: She is not in acute distress.     Appearance: Normal appearance. She is not diaphoretic.   Cardiovascular:      Rate and Rhythm: Normal rate and regular rhythm.      Heart sounds: Normal heart sounds. No murmur heard.     No friction rub. No gallop.   Pulmonary:      Effort: Pulmonary effort is normal. No respiratory distress.      Breath sounds: Normal breath sounds. No wheezing.   Chest:   Breasts:     Right: No swelling, bleeding, inverted nipple, mass, nipple discharge, skin change or tenderness.      Left: No swelling, bleeding, inverted nipple, mass, nipple discharge, skin change or tenderness.   Abdominal:      General: Bowel sounds are normal. There is no distension.      Palpations: Abdomen is soft.      Tenderness: There is no abdominal tenderness.   Musculoskeletal:         General: No swelling or tenderness. Normal range of motion.   Lymphadenopathy:      Upper Body:      Right upper body: No supraclavicular or axillary adenopathy.      Left upper body: No supraclavicular or axillary adenopathy.   Skin:     General: Skin is warm and dry.   Neurological:      Mental Status: She is alert and oriented to person, place, and time.   Psychiatric:         Mood and Affect: Mood normal.         Behavior: Behavior normal.       Labs:     Latest Reference Range & Units 11/08/24 11:17   WBC 4.8 - 10.8 K/uL 7.9   RBC 4.20 - 5.40 M/uL 4.45   Hemoglobin 12.0 - 16.0 g/dL 12.8   Hematocrit 37.0 - 47.0 % 38.8   MCV 81.4 - 97.8  fL 87.2   MCH 27.0 - 33.0 pg 28.8   MCHC 32.2 - 35.5 g/dL 33.0   RDW 35.9 - 50.0 fL 41.1   Platelet Count 164 - 446 K/uL 334   MPV 9.0 - 12.9 fL 11.3   Neutrophils-Polys 44.00 - 72.00 % 57.70   Neutrophils (Absolute) 1.82 - 7.42 K/uL 4.56   Lymphocytes 22.00 - 41.00 % 32.90   Lymphs (Absolute) 1.00 - 4.80 K/uL 2.60   Monocytes 0.00 - 13.40 % 6.10   Monos (Absolute) 0.00 - 0.85 K/uL 0.48   Eosinophils 0.00 - 6.90 % 2.00   Eos (Absolute) 0.00 - 0.51 K/uL 0.16   Basophils 0.00 - 1.80 % 0.90   Baso (Absolute) 0.00 - 0.12 K/uL 0.07   Immature Granulocytes 0.00 - 0.90 % 0.40   Immature Granulocytes (abs) 0.00 - 0.11 K/uL 0.03   Nucleated RBC 0.00 - 0.20 /100 WBC 0.00   NRBC (Absolute) K/uL 0.00   Sodium 135 - 145 mmol/L 141   Potassium 3.6 - 5.5 mmol/L 4.9   Chloride 96 - 112 mmol/L 105   Co2 20 - 33 mmol/L 24   Anion Gap 7.0 - 16.0  12.0   Glucose 65 - 99 mg/dL 104 (H)   Bun 8 - 22 mg/dL 22   Creatinine 0.50 - 1.40 mg/dL 1.20   GFR (CKD-EPI) >60 mL/min/1.73 m 2 49 !   Calcium 8.5 - 10.5 mg/dL 10.4   Correct Calcium 8.5 - 10.5 mg/dL 9.8   AST(SGOT) 12 - 45 U/L 20   ALT(SGPT) 2 - 50 U/L 8   Alkaline Phosphatase 30 - 99 U/L 116 (H)   Total Bilirubin 0.1 - 1.5 mg/dL 0.6   Albumin 3.2 - 4.9 g/dL 4.7   Total Protein 6.0 - 8.2 g/dL 8.3 (H)   Globulin 1.9 - 3.5 g/dL 3.6 (H)   A-G Ratio g/dL 1.3   Glycohemoglobin 4.0 - 5.6 % 6.0 (H)   Estim. Avg Glu mg/dL 126   Cholesterol,Tot 100 - 199 mg/dL 153   Triglycerides 0 - 149 mg/dL 143   HDL >=40 mg/dL 42   LDL <100 mg/dL 82   (H): Data is abnormally high  !: Data is abnormal      Latest Reference Range & Units 24 09:33   25-Hydroxy   Vitamin D 25 30 - 100 ng/mL 44   Vitamin B12 -True Cobalamin 211 - 911 pg/mL 1117 (H)   Free T-4 0.93 - 1.70 ng/dL 1.13   T3,Free 2.00 - 4.40 pg/mL 4.05   TSH 0.350 - 5.500 uIU/mL 0.763   (H): Data is abnormally high      IMAGIN/9/2024 Bilateral tomosynthesis diagnostic mammography   1.  No evidence for malignancy  2.  Bilateral  postoperative changes  3.  Recommendation annual screening mammography  R2 - CATEGORY 2: BENIGN FINDING(S)      10/21/2024 Dexa Scan:  The lumbar spine has a mean bone mineral density of 1.150 g/cm2, with a T score of -0.3 and a Z score of 1.7.   The proximal left femur has a mean bone mineral density of 0.912 g/cm2, with a T score of -0.8 and a Z score of 0.8.  Bone mineral density of this patient is normal in the spine and normal in the proximal left femur.          Assessment & Plan           Impression:  1. History of bilateral DCIS status postlumpectomy and radiation therapy on chemoprophylaxis with letrozole since 2020.   2. At risk for osteoporosis - Last DEXA scan October 2024 normal. Highly encouraged vitamin D and calcium supplements.       Plan:  Patient to continue with letrozole as planned.  Next bilateral diagnostic mammogram due 7/10/2025.  Next DEXA scan due 10/22/2026.  Return to clinic in 6 months for next follow-up appointment with labs same day.      Reviewed with patient strategies to help reduce the risk of breast cancer recurrence, including achieving/maintaining a healthy BMI, limiting alcoholic intake, and complete abstinence from use of tobacco products.  Also reviewed with patient signs and symptoms for which to urgently contact the clinic, including the development of any new lumps or masses, any skin puckering,dimpling or textural changes, any new nipple inversion, or any nipple discharge.     Reviewed with patient strategies to maintain bone density, including daily calcium intake of 1200 mg and vitamin D supplement of 2000 IU.  Patient also instructed to engage in at least 30 minutes of weightbearing activity daily.     Plan of care reviewed with patient and all questions answered.  Patient verbalizes understanding, denies questions, and agrees with plan of care.  Patient instructed to call or message clinic with any questions or concerns, contact information provided.    Thank you for  allowing me the privilege of caring for this patient.  If if you have any questions, please do not hesitate to reach out.  I may be contacted at [phone].    Taty Coronado, MSN, AOCNP, FNP-C    Please note that this dictation was created using voice recognition software. I have made every reasonable attempt to correct obvious errors, but I expect that there are errors of grammar, and possibly content, that I did not discover before finalizing the note.

## 2024-12-17 ENCOUNTER — HOSPITAL ENCOUNTER (OUTPATIENT)
Dept: HEMATOLOGY ONCOLOGY | Facility: MEDICAL CENTER | Age: 69
End: 2024-12-17
Payer: COMMERCIAL

## 2025-01-06 PROCEDURE — RXMED WILLOW AMBULATORY MEDICATION CHARGE: Performed by: INTERNAL MEDICINE

## 2025-01-08 ENCOUNTER — PHARMACY VISIT (OUTPATIENT)
Dept: PHARMACY | Facility: MEDICAL CENTER | Age: 70
End: 2025-01-08
Payer: COMMERCIAL

## 2025-01-10 ASSESSMENT — ENCOUNTER SYMPTOMS
MYALGIAS: 0
DIARRHEA: 0
CONSTIPATION: 0
SHORTNESS OF BREATH: 0
NAUSEA: 0
WEIGHT LOSS: 0
HEADACHES: 0
DIAPHORESIS: 1
CHILLS: 0
COUGH: 0
PALPITATIONS: 0
DIZZINESS: 0
FEVER: 0
VOMITING: 0

## 2025-01-10 NOTE — PROGRESS NOTES
Subjective     Leslie Tamez is a 69 y.o. female who presents for continued management of DCIS.        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    Oncology history of presenting illness:  Leslie Tamez is a 68 y.o. female with a remote history of right DCIS and a more recent history of left-sided DCIS treated with partial mastectomy and radiation in 2020. She saw Dr. Shoemaker in [place] who started her on letrozole at that time. She is tolerating it very well with no hot flashes night sweats or musculoskeletal symptoms. She had a recent bone density test that showed normal bone density. Her last mammogram was in March 2022. She has no problems with her breast now. Other than hypertension her health is good.     Interval histories:  Interval history 12/14/2023: She has done very well over the past 6 months. Routine mammogram screening in July 2023 was normal. She tolerates letrozole without any difficulty whatsoever. No new medical problems.     Interval history 06/13/24 (Evette, APRN):  Patient is doing very well.  She does note hot flash every once in a while but not bothersome.  She is tolerating letrozole with no difficulties.  She did recently undergo thyroid biopsy which was negative.    Interval history 1/14/2025 (Taty Coronado, AOCNP): Patient returns to clinic today for follow-up appointment.  She continues on her letrozole without any significant side effects.  Patient denies any changes in her breast, including any new lumps or masses, skin dimpling, puckering, or erythema, nipple retraction or discharge, or any breast tenderness/discomfort.   She states she continues to eat well, and gets plenty of exercise at the gym at least 5 days/week.  She also takes  supplemental calcium plus vitamin D.  No other complaints or concerns provided.      Treatment history:  End of 2019: Partial mastectomy - [place], [place]   March 2020: XRT - [place]  Around April 2020: Initiation of Letrozole - Dr. Shoemaker in [place], [place]      Review of Systems   Constitutional:  Positive for diaphoresis (once in a while she will get a hot flash but tolerable). Negative for chills, fever, malaise/fatigue and weight loss.   HENT: Negative.     Eyes: Negative.    Respiratory:  Negative for cough and shortness of breath.    Cardiovascular:  Negative for chest pain and palpitations.   Gastrointestinal:  Negative for constipation, diarrhea, nausea and vomiting.   Genitourinary:  Negative for dysuria.   Musculoskeletal:  Negative for myalgias.   Skin: Negative.    Neurological:  Negative for dizziness and headaches.   Endo/Heme/Allergies: Negative.    Psychiatric/Behavioral: Negative.       PAST MEDICAL HISTORY:   No past medical history on file.    PAST SURGICAL HISTORY:   Past Surgical History:   Procedure Laterality Date    COLONOSCOPY   Colonoscopy (Fiberoptic) - YES     SOCIAL HISTORY:   reports that she has never smoked. She has never used smokeless tobacco.     FAMILY HISTORY:   Problem Relation Age of Onset    Prostate cancer Father   Family history of malignant neoplasm of prostate    Other (see comment) Other   Family history of Urologic Disorder Description: -- Mom possibly with blood in the urine    Hypertension Father   Family history of hypertension    High cholesterol Father   Family history of High cholesterol    Colon cancer Father   Family history of Colon cancer       Allergies   Allergen Reactions    Sulfa Drugs Hives and Rash    Hydrocodone Vomiting and Nausea     Vicodin    Cephalexin     Codeine Unspecified     pt felt disoriented    Hydrocodone-Acetaminophen Vomiting    Septra [Sulfamethoxazole W-Trimethoprim] Hives    Latex Rash     Rash  Other reaction(s): Rash  rash  "    Current Outpatient Medications on File Prior to Encounter   Medication Sig Dispense Refill    rosuvastatin (CRESTOR) 5 MG Tab Take 1 Tablet by mouth every evening. 90 Tablet 3    losartan (COZAAR) 100 MG Tab Take 1 Tablet by mouth every day. 90 Tablet 1    atenolol (TENORMIN) 100 MG Tab Take 1 Tablet by mouth every day. 90 Tablet 3    NIFEdipine (ADALAT CC) 30 MG CR tablet Take 1 Tablet by mouth every day. 90 Tablet 1    letrozole (FEMARA) 2.5 MG Tab Take 1 Tablet by mouth every day. 90 Tablet 1    triamcinolone acetonide (KENALOG) 0.5 % Cream Apply 0.5 g topically 3 times a day.      Misc Natural Products (OSTEO BI-FLEX TRIPLE STRENGTH PO) Take  by mouth.      CALCIUM PO Take  by mouth.      Omega-3 Fatty Acids (FISH OIL) 1000 MG Cap capsule Take 1,000 mg by mouth every day.      cyanocobalamin (VITAMIN B12) 1000 MCG Tab Take 1,000 mcg by mouth every day.       No current facility-administered medications on file prior to encounter.           Objective     BP (!) 144/76 (BP Location: Right arm, Patient Position: Sitting, BP Cuff Size: Adult)   Pulse 61   Temp 36.4 °C (97.6 °F) (Temporal)   Ht 1.575 m (5' 2.01\")   Wt 59 kg (129 lb 15.4 oz)   SpO2 96%   BMI 23.76 kg/m²       Physical Exam  Vitals reviewed.   Constitutional:       General: She is not in acute distress.     Appearance: Normal appearance. She is normal weight. She is not diaphoretic.   HENT:      Head: Normocephalic and atraumatic.      Nose: Nose normal.      Mouth/Throat:      Pharynx: Oropharynx is clear.   Eyes:      Extraocular Movements: Extraocular movements intact.      Conjunctiva/sclera: Conjunctivae normal.   Cardiovascular:      Rate and Rhythm: Normal rate and regular rhythm.      Heart sounds: Normal heart sounds. No murmur heard.     No friction rub. No gallop.   Pulmonary:      Effort: Pulmonary effort is normal. No respiratory distress.      Breath sounds: Normal breath sounds. No wheezing.   Chest:   Breasts:     Right: No " swelling, bleeding, inverted nipple, mass, nipple discharge, skin change or tenderness.      Left: No swelling, bleeding, inverted nipple, mass, nipple discharge, skin change or tenderness.      Comments: 1/14/2025 left breast with well-healed surgical scar and inverted nipple which is unchanged for many years per patient. No palpable masses or lumps, no skin puckering or dimpling, and no nipple discharge in either breast.  Abdominal:      General: Bowel sounds are normal. There is no distension.      Palpations: Abdomen is soft.      Tenderness: There is no abdominal tenderness.   Musculoskeletal:         General: No swelling or tenderness. Normal range of motion.      Cervical back: Normal range of motion and neck supple.   Lymphadenopathy:      Cervical: No cervical adenopathy.      Upper Body:      Right upper body: No supraclavicular or axillary adenopathy.      Left upper body: No supraclavicular or axillary adenopathy.      Lower Body: No right inguinal adenopathy. No left inguinal adenopathy.   Skin:     General: Skin is warm and dry.   Neurological:      General: No focal deficit present.      Mental Status: She is alert and oriented to person, place, and time.   Psychiatric:         Mood and Affect: Mood normal.         Behavior: Behavior normal.         Thought Content: Thought content normal.         Judgment: Judgment normal.       Labs:     Latest Reference Range & Units 11/08/24 11:17   WBC 4.8 - 10.8 K/uL 7.9   RBC 4.20 - 5.40 M/uL 4.45   Hemoglobin 12.0 - 16.0 g/dL 12.8   Hematocrit 37.0 - 47.0 % 38.8   MCV 81.4 - 97.8 fL 87.2   MCH 27.0 - 33.0 pg 28.8   MCHC 32.2 - 35.5 g/dL 33.0   RDW 35.9 - 50.0 fL 41.1   Platelet Count 164 - 446 K/uL 334   MPV 9.0 - 12.9 fL 11.3   Neutrophils-Polys 44.00 - 72.00 % 57.70   Neutrophils (Absolute) 1.82 - 7.42 K/uL 4.56   Lymphocytes 22.00 - 41.00 % 32.90   Lymphs (Absolute) 1.00 - 4.80 K/uL 2.60   Monocytes 0.00 - 13.40 % 6.10   Monos (Absolute) 0.00 - 0.85 K/uL  0.48   Eosinophils 0.00 - 6.90 % 2.00   Eos (Absolute) 0.00 - 0.51 K/uL 0.16   Basophils 0.00 - 1.80 % 0.90   Baso (Absolute) 0.00 - 0.12 K/uL 0.07   Immature Granulocytes 0.00 - 0.90 % 0.40   Immature Granulocytes (abs) 0.00 - 0.11 K/uL 0.03   Nucleated RBC 0.00 - 0.20 /100 WBC 0.00   NRBC (Absolute) K/uL 0.00   Sodium 135 - 145 mmol/L 141   Potassium 3.6 - 5.5 mmol/L 4.9   Chloride 96 - 112 mmol/L 105   Co2 20 - 33 mmol/L 24   Anion Gap 7.0 - 16.0  12.0   Glucose 65 - 99 mg/dL 104 (H)   Bun 8 - 22 mg/dL 22   Creatinine 0.50 - 1.40 mg/dL 1.20   GFR (CKD-EPI) >60 mL/min/1.73 m 2 49 !   Calcium 8.5 - 10.5 mg/dL 10.4   Correct Calcium 8.5 - 10.5 mg/dL 9.8   AST(SGOT) 12 - 45 U/L 20   ALT(SGPT) 2 - 50 U/L 8   Alkaline Phosphatase 30 - 99 U/L 116 (H)   Total Bilirubin 0.1 - 1.5 mg/dL 0.6   Albumin 3.2 - 4.9 g/dL 4.7   Total Protein 6.0 - 8.2 g/dL 8.3 (H)   Globulin 1.9 - 3.5 g/dL 3.6 (H)   A-G Ratio g/dL 1.3   Glycohemoglobin 4.0 - 5.6 % 6.0 (H)   Estim. Avg Glu mg/dL 126   Cholesterol,Tot 100 - 199 mg/dL 153   Triglycerides 0 - 149 mg/dL 143   HDL >=40 mg/dL 42   LDL <100 mg/dL 82   (H): Data is abnormally high  !: Data is abnormal      Latest Reference Range & Units 24 09:33   25-Hydroxy   Vitamin D 25 30 - 100 ng/mL 44   Vitamin B12 -True Cobalamin 211 - 911 pg/mL 1117 (H)   Free T-4 0.93 - 1.70 ng/dL 1.13   T3,Free 2.00 - 4.40 pg/mL 4.05   TSH 0.350 - 5.500 uIU/mL 0.763   (H): Data is abnormally high      IMAGIN/9/2024 Bilateral tomosynthesis diagnostic mammography   1.  No evidence for malignancy  2.  Bilateral postoperative changes  3.  Recommendation annual screening mammography  R2 - CATEGORY 2: BENIGN FINDING(S)      10/21/2024 Dexa Scan:  The lumbar spine has a mean bone mineral density of 1.150 g/cm2, with a T score of -0.3 and a Z score of 1.7.   The proximal left femur has a mean bone mineral density of 0.912 g/cm2, with a T score of -0.8 and a Z score of 0.8.  Bone mineral density of this  patient is normal in the spine and normal in the proximal left femur.          Assessment & Plan         Impression:  1. History of bilateral DCIS status postlumpectomy and radiation therapy on chemoprophylaxis with letrozole since 2020.   2. At risk for osteoporosis - Last DEXA scan October 2024 normal. Highly encouraged vitamin D and calcium supplements.       Plan:  Patient to continue with letrozole as planned.  Patient will complete 5 years of therapy in April 2025 and she will discontinue letrozole at that time.  Next bilateral diagnostic mammogram due 7/10/2025.  Next DEXA scan due 10/22/2026.  Return to clinic in 6 months for next follow-up appointment.      Reviewed with patient strategies to help reduce the risk of breast cancer recurrence, including achieving/maintaining a healthy BMI, limiting alcoholic intake, and complete abstinence from use of tobacco products.  Also reviewed with patient signs and symptoms for which to urgently contact the clinic, including the development of any new lumps or masses, any skin puckering,dimpling or textural changes, any new nipple inversion, or any nipple discharge.     Reviewed with patient strategies to maintain bone density, including daily calcium intake of 1200 mg and vitamin D supplement of 2000 IU.  Patient also instructed to engage in at least 30 minutes of weightbearing activity daily.     Plan of care reviewed with patient and all questions answered.  Patient verbalizes understanding, denies questions, and agrees with plan of care.  Patient instructed to call or message clinic with any questions or concerns, contact information provided.    Thank you for allowing me the privilege of caring for this patient.  If if you have any questions, please do not hesitate to reach out.  I may be contacted at [phone].    Taty Coronado, MICHELLE, AOCNP, FNP-C    Please note that this dictation was created using voice recognition software. I have made every reasonable  attempt to correct obvious errors, but I expect that there are errors of grammar, and possibly content, that I did not discover before finalizing the note.

## 2025-01-14 ENCOUNTER — HOSPITAL ENCOUNTER (OUTPATIENT)
Dept: HEMATOLOGY ONCOLOGY | Facility: MEDICAL CENTER | Age: 70
End: 2025-01-14
Payer: MEDICARE

## 2025-01-14 VITALS
BODY MASS INDEX: 23.92 KG/M2 | DIASTOLIC BLOOD PRESSURE: 76 MMHG | WEIGHT: 129.96 LBS | TEMPERATURE: 97.6 F | HEART RATE: 61 BPM | OXYGEN SATURATION: 96 % | HEIGHT: 62 IN | SYSTOLIC BLOOD PRESSURE: 144 MMHG

## 2025-01-14 DIAGNOSIS — D05.12 DUCTAL CARCINOMA IN SITU (DCIS) OF LEFT BREAST: ICD-10-CM

## 2025-01-14 DIAGNOSIS — C50.412 MALIGNANT NEOPLASM OF UPPER-OUTER QUADRANT OF LEFT FEMALE BREAST, UNSPECIFIED ESTROGEN RECEPTOR STATUS (HCC): ICD-10-CM

## 2025-01-14 DIAGNOSIS — Z79.811 USE OF LETROZOLE (FEMARA): ICD-10-CM

## 2025-01-14 PROCEDURE — 99212 OFFICE O/P EST SF 10 MIN: CPT

## 2025-01-14 PROCEDURE — 99214 OFFICE O/P EST MOD 30 MIN: CPT

## 2025-01-14 ASSESSMENT — PAIN SCALES - GENERAL: PAINLEVEL_OUTOF10: NO PAIN

## 2025-01-14 ASSESSMENT — FIBROSIS 4 INDEX: FIB4 SCORE: 1.46

## 2025-01-14 ASSESSMENT — ENCOUNTER SYMPTOMS
EYES NEGATIVE: 1
PSYCHIATRIC NEGATIVE: 1

## 2025-01-14 NOTE — ADDENDUM NOTE
Encounter addended by: Ashutosh Real, Med Ass't on: 1/14/2025 3:32 PM   Actions taken: Charge Capture section accepted

## 2025-01-28 DIAGNOSIS — D05.12 DUCTAL CARCINOMA IN SITU (DCIS) OF LEFT BREAST: ICD-10-CM

## 2025-01-28 RX ORDER — LETROZOLE 2.5 MG/1
2.5 TABLET, FILM COATED ORAL DAILY
Qty: 90 TABLET | Refills: 1 | Status: SHIPPED | OUTPATIENT
Start: 2025-01-28

## 2025-01-29 PROCEDURE — RXMED WILLOW AMBULATORY MEDICATION CHARGE: Performed by: INTERNAL MEDICINE

## 2025-01-31 PROCEDURE — RXMED WILLOW AMBULATORY MEDICATION CHARGE: Performed by: INTERNAL MEDICINE

## 2025-02-04 ENCOUNTER — PHARMACY VISIT (OUTPATIENT)
Dept: PHARMACY | Facility: MEDICAL CENTER | Age: 70
End: 2025-02-04
Payer: COMMERCIAL

## 2025-03-06 DIAGNOSIS — I10 PRIMARY HYPERTENSION: ICD-10-CM

## 2025-03-06 PROCEDURE — RXMED WILLOW AMBULATORY MEDICATION CHARGE: Performed by: INTERNAL MEDICINE

## 2025-03-06 RX ORDER — NIFEDIPINE 30 MG
30 TABLET, EXTENDED RELEASE ORAL DAILY
Qty: 90 TABLET | Refills: 1 | Status: SHIPPED | OUTPATIENT
Start: 2025-03-06

## 2025-03-06 RX ORDER — LOSARTAN POTASSIUM 100 MG/1
100 TABLET ORAL DAILY
Qty: 90 TABLET | Refills: 1 | Status: SHIPPED | OUTPATIENT
Start: 2025-03-06

## 2025-03-06 NOTE — TELEPHONE ENCOUNTER
Received request via: Patient    Was the patient seen in the last year in this department? Yes    Does the patient have an active prescription (recently filled or refills available) for medication(s) requested? Yes.     Pharmacy Name: [facility]     Does the patient have intermediate Plus and need 100-day supply? (This applies to ALL medications) Patient does not have SCP

## 2025-03-07 ENCOUNTER — PHARMACY VISIT (OUTPATIENT)
Dept: PHARMACY | Facility: MEDICAL CENTER | Age: 70
End: 2025-03-07
Payer: COMMERCIAL

## 2025-04-07 ENCOUNTER — PHARMACY VISIT (OUTPATIENT)
Dept: PHARMACY | Facility: MEDICAL CENTER | Age: 70
End: 2025-04-07
Payer: COMMERCIAL

## 2025-04-07 PROCEDURE — RXMED WILLOW AMBULATORY MEDICATION CHARGE: Performed by: INTERNAL MEDICINE

## 2025-06-10 PROCEDURE — RXMED WILLOW AMBULATORY MEDICATION CHARGE: Performed by: INTERNAL MEDICINE

## 2025-06-11 ENCOUNTER — PHARMACY VISIT (OUTPATIENT)
Dept: PHARMACY | Facility: MEDICAL CENTER | Age: 70
End: 2025-06-11
Payer: COMMERCIAL

## 2025-06-24 ENCOUNTER — APPOINTMENT (OUTPATIENT)
Dept: MEDICAL GROUP | Age: 70
End: 2025-06-24
Payer: MEDICARE

## 2025-06-27 ENCOUNTER — HOSPITAL ENCOUNTER (OUTPATIENT)
Dept: LAB | Facility: MEDICAL CENTER | Age: 70
End: 2025-06-27
Attending: INTERNAL MEDICINE
Payer: MEDICARE

## 2025-06-27 DIAGNOSIS — R73.03 PREDIABETES: ICD-10-CM

## 2025-06-27 DIAGNOSIS — I10 PRIMARY HYPERTENSION: ICD-10-CM

## 2025-06-27 LAB
ALBUMIN SERPL BCP-MCNC: 4.5 G/DL (ref 3.2–4.9)
ALBUMIN/GLOB SERPL: 1.3 G/DL
ALP SERPL-CCNC: 117 U/L (ref 30–99)
ALT SERPL-CCNC: 13 U/L (ref 2–50)
ANION GAP SERPL CALC-SCNC: 10 MMOL/L (ref 7–16)
AST SERPL-CCNC: 21 U/L (ref 12–45)
BILIRUB SERPL-MCNC: 0.6 MG/DL (ref 0.1–1.5)
BUN SERPL-MCNC: 16 MG/DL (ref 8–22)
CALCIUM ALBUM COR SERPL-MCNC: 9.2 MG/DL (ref 8.5–10.5)
CALCIUM SERPL-MCNC: 9.6 MG/DL (ref 8.5–10.5)
CHLORIDE SERPL-SCNC: 106 MMOL/L (ref 96–112)
CHOLEST SERPL-MCNC: 162 MG/DL (ref 100–199)
CO2 SERPL-SCNC: 25 MMOL/L (ref 20–33)
CREAT SERPL-MCNC: 1.02 MG/DL (ref 0.5–1.4)
EST. AVERAGE GLUCOSE BLD GHB EST-MCNC: 123 MG/DL
GFR SERPLBLD CREATININE-BSD FMLA CKD-EPI: 59 ML/MIN/1.73 M 2
GLOBULIN SER CALC-MCNC: 3.4 G/DL (ref 1.9–3.5)
GLUCOSE SERPL-MCNC: 94 MG/DL (ref 65–99)
HBA1C MFR BLD: 5.9 % (ref 4–5.6)
HDLC SERPL-MCNC: 44 MG/DL
LDLC SERPL CALC-MCNC: 85 MG/DL
POTASSIUM SERPL-SCNC: 4.5 MMOL/L (ref 3.6–5.5)
PROT SERPL-MCNC: 7.9 G/DL (ref 6–8.2)
SODIUM SERPL-SCNC: 141 MMOL/L (ref 135–145)
TRIGL SERPL-MCNC: 166 MG/DL (ref 0–149)

## 2025-06-27 PROCEDURE — 83036 HEMOGLOBIN GLYCOSYLATED A1C: CPT | Mod: GA

## 2025-06-27 PROCEDURE — 36415 COLL VENOUS BLD VENIPUNCTURE: CPT

## 2025-06-27 PROCEDURE — 80061 LIPID PANEL: CPT

## 2025-06-27 PROCEDURE — 80053 COMPREHEN METABOLIC PANEL: CPT

## 2025-06-30 ENCOUNTER — RESULTS FOLLOW-UP (OUTPATIENT)
Dept: MEDICAL GROUP | Age: 70
End: 2025-06-30
Payer: MEDICARE

## 2025-06-30 NOTE — RESULT ENCOUNTER NOTE
Left patient a message letting her know about her A1c and cholesterol and that the rest will be discussed at her up coming visit

## 2025-07-01 ENCOUNTER — APPOINTMENT (OUTPATIENT)
Dept: MEDICAL GROUP | Age: 70
End: 2025-07-01
Payer: MEDICARE

## 2025-07-01 VITALS
DIASTOLIC BLOOD PRESSURE: 62 MMHG | BODY MASS INDEX: 23.55 KG/M2 | HEIGHT: 62 IN | HEART RATE: 68 BPM | OXYGEN SATURATION: 97 % | WEIGHT: 128 LBS | TEMPERATURE: 98 F | SYSTOLIC BLOOD PRESSURE: 118 MMHG | RESPIRATION RATE: 16 BRPM

## 2025-07-01 DIAGNOSIS — C50.412 MALIGNANT NEOPLASM OF UPPER-OUTER QUADRANT OF LEFT FEMALE BREAST, UNSPECIFIED ESTROGEN RECEPTOR STATUS (HCC): ICD-10-CM

## 2025-07-01 DIAGNOSIS — Z12.83 SKIN CANCER SCREENING: ICD-10-CM

## 2025-07-01 DIAGNOSIS — E53.8 VITAMIN B12 DEFICIENCY: ICD-10-CM

## 2025-07-01 DIAGNOSIS — R73.03 PREDIABETES: Chronic | ICD-10-CM

## 2025-07-01 DIAGNOSIS — R94.4 DECREASED GFR: Primary | ICD-10-CM

## 2025-07-01 DIAGNOSIS — E05.90 SUBCLINICAL HYPERTHYROIDISM: ICD-10-CM

## 2025-07-01 DIAGNOSIS — E55.9 VITAMIN D DEFICIENCY: ICD-10-CM

## 2025-07-01 DIAGNOSIS — I10 PRIMARY HYPERTENSION: ICD-10-CM

## 2025-07-01 DIAGNOSIS — E78.5 HYPERLIPIDEMIA, UNSPECIFIED HYPERLIPIDEMIA TYPE: Chronic | ICD-10-CM

## 2025-07-01 PROCEDURE — 99214 OFFICE O/P EST MOD 30 MIN: CPT | Performed by: INTERNAL MEDICINE

## 2025-07-01 PROCEDURE — 3074F SYST BP LT 130 MM HG: CPT | Performed by: INTERNAL MEDICINE

## 2025-07-01 PROCEDURE — 3078F DIAST BP <80 MM HG: CPT | Performed by: INTERNAL MEDICINE

## 2025-07-01 RX ORDER — ROSUVASTATIN CALCIUM 5 MG/1
5 TABLET, COATED ORAL EVERY EVENING
Qty: 90 TABLET | Refills: 3 | Status: SHIPPED | OUTPATIENT
Start: 2025-07-01

## 2025-07-01 RX ORDER — ATENOLOL 100 MG/1
100 TABLET ORAL DAILY
Qty: 90 TABLET | Refills: 3 | Status: SHIPPED | OUTPATIENT
Start: 2025-07-01

## 2025-07-01 RX ORDER — NIFEDIPINE 30 MG
30 TABLET, EXTENDED RELEASE ORAL DAILY
Qty: 90 TABLET | Refills: 3 | Status: SHIPPED | OUTPATIENT
Start: 2025-07-01

## 2025-07-01 RX ORDER — LOSARTAN POTASSIUM 100 MG/1
100 TABLET ORAL DAILY
Qty: 90 TABLET | Refills: 3 | Status: SHIPPED | OUTPATIENT
Start: 2025-07-01

## 2025-07-01 ASSESSMENT — PATIENT HEALTH QUESTIONNAIRE - PHQ9: CLINICAL INTERPRETATION OF PHQ2 SCORE: 0

## 2025-07-01 ASSESSMENT — FIBROSIS 4 INDEX: FIB4 SCORE: 1.2

## 2025-07-01 NOTE — PROGRESS NOTES
CC:   Chief Complaint   Patient presents with    Lab Results    Medication Refill     Atenolol, losartan, Rosuvastatin, Nifedipine      The primary encounter diagnosis was Decreased GFR. Diagnoses of Malignant neoplasm of upper-outer quadrant of left female breast, unspecified estrogen receptor status (HCC), Primary hypertension, Hyperlipidemia, unspecified hyperlipidemia type, Prediabetes, Subclinical hyperthyroidism, Vitamin B12 deficiency, Vitamin D deficiency, and Skin cancer screening were also pertinent to this visit.  Verbal consent was acquired by the patient to use [employer] ambient listening note generation during this visit Yes     History of Present Illness  Leslie is a pleasant 70 y.o. female  with a history of ductal carcinoma of the left breast, hyperlipidemia, and hypertension, presenting for lab follow-up.    She has observed occasional swelling in her legs, which she manages by elevating them. This issue was particularly noticeable during a recent outing with her cousin when her legs were left hanging. She maintains an active lifestyle, including running, and is currently training for a 5K race. She also ensures adequate hydration, especially during her gym sessions.    She reports consuming more sweets than usual during a trip to [place] but has since reduced her intake of fried foods. She is currently taking vitamin B12 supplements daily, along with fish oil, D3, and calcium.    She continues to see her oncologist and has a mammogram scheduled for next week, followed by an oncology appointment the week after. She completed a 5-year course of letrozole on 05/13/2025.    She has not been seeing endocrinology at [facility] and needs to go back but is trying to get secondary insurance. She is working on that and has not found one yet. She only has Medicare.    Past Medical History[1]    Current Medications and Prescriptions Ordered in Epic[2]    Review of Systems   All other systems reviewed and are  "negative.    Objective:     Exam:  /62 (BP Location: Left arm, Patient Position: Sitting, BP Cuff Size: Adult)   Pulse 68   Temp 36.7 °C (98 °F) (Temporal)   Resp 16   Ht 1.575 m (5' 2.01\")   Wt 58.1 kg (128 lb)   SpO2 97%   BMI 23.40 kg/m²  Body mass index is 23.4 kg/m².    Physical Exam  Constitutional:       Appearance: Normal appearance.   HENT:      Head: Normocephalic and atraumatic.   Cardiovascular:      Pulses: Normal pulses.      Heart sounds: Normal heart sounds.   Pulmonary:      Effort: Pulmonary effort is normal. No respiratory distress.      Breath sounds: Normal breath sounds.   Neurological:      Mental Status: She is alert.   Psychiatric:         Mood and Affect: Mood normal.         Behavior: Behavior normal.         Thought Content: Thought content normal.         Judgment: Judgment normal.       Results: Reviewed and discussed results   Latest Reference Range & Units 06/27/25 10:56   Sodium 135 - 145 mmol/L 141   Potassium 3.6 - 5.5 mmol/L 4.5   Chloride 96 - 112 mmol/L 106   Co2 20 - 33 mmol/L 25   Anion Gap 7.0 - 16.0  10.0   Glucose 65 - 99 mg/dL 94   Bun 8 - 22 mg/dL 16   Creatinine 0.50 - 1.40 mg/dL 1.02   GFR (CKD-EPI) >60 mL/min/1.73 m 2 59 !   Calcium 8.5 - 10.5 mg/dL 9.6   Correct Calcium 8.5 - 10.5 mg/dL 9.2   AST(SGOT) 12 - 45 U/L 21   ALT(SGPT) 2 - 50 U/L 13   Alkaline Phosphatase 30 - 99 U/L 117 (H)   Total Bilirubin 0.1 - 1.5 mg/dL 0.6   Albumin 3.2 - 4.9 g/dL 4.5   Total Protein 6.0 - 8.2 g/dL 7.9   Globulin 1.9 - 3.5 g/dL 3.4   A-G Ratio g/dL 1.3   Glycohemoglobin 4.0 - 5.6 % 5.9 (H)   Estim. Avg Glu mg/dL 123   Cholesterol,Tot 100 - 199 mg/dL 162   Triglycerides 0 - 149 mg/dL 166 (H)   HDL >=40 mg/dL 44   LDL <100 mg/dL 85   !: Data is abnormal  (H): Data is abnormally high  Results      Assessment & Plan:   Leslie  is a pleasant 70 y.o. female with the following -   Assessment & Plan  1. Hyperlipidemia.  - Cholesterol levels are satisfactory, but there is a slight " elevation in triglycerides.  - Advised to reduce intake of sweets, processed foods, fried items, and fatty substances.  - Currently taking rosuvastatin for cholesterol management.  - Blood tests ordered for next visit in 6 months to recheck cholesterol levels.    2. Prediabetes.  - Blood sugar levels have improved, decreasing from 6.0 to 5.9%.  - Fasting sugar has improved from 104 to 94.  - Encouraged to maintain current regimen and continue efforts towards better health.  - Blood tests ordered for next visit in 6 months to recheck blood sugar levels.    3. History of ductal carcinoma in situ.  - Discontinued letrozole as of 05/13/2025, after taking it for 5 years.  - Mammogram scheduled for next week and oncology appointment the following week.  - Letrozole removed from medication list.  - Continues follow-up with oncology.    4. Leg swelling.  - Reports occasional leg swelling, worsens when legs are hanging down.  - Advised to cut salt intake and use compression socks.  - Ensure adequate hydration, drinking a minimum of 64 ounces of water daily and an additional 32 ounces per hour of strenuous activity.  - Monitoring for any further progression of symptoms.    5. Health maintenance.  - Due for tetanus vaccine (Tdap), advised to get it done at the pharmacy.  - [facility] for a comprehensive skin examination.  - Blood tests ordered for next visit in 6 months to check cholesterol, blood sugar, vitamin D levels, and vitamin B12 levels.  - Advised to drink water before blood tests to ensure accurate results.    Problem List Items Addressed This Visit       Decreased GFR - Primary    Hyperlipidemia (Chronic)    Relevant Medications    atenolol (TENORMIN) 100 MG Tab    losartan (COZAAR) 100 MG Tab    NIFEdipine (ADALAT CC) 30 MG CR tablet    rosuvastatin (CRESTOR) 5 MG Tab    Hypertension (Chronic)    Relevant Medications    atenolol (TENORMIN) 100 MG Tab    losartan (COZAAR) 100 MG Tab     NIFEdipine (ADALAT CC) 30 MG CR tablet    rosuvastatin (CRESTOR) 5 MG Tab    Other Relevant Orders    Lipid Profile    CBC WITH DIFFERENTIAL    Comp Metabolic Panel    Malignant neoplasm of upper-outer quadrant of left female breast (HCC)    Prediabetes (Chronic)    Relevant Orders    HEMOGLOBIN A1C    Subclinical hyperthyroidism    Relevant Orders    TSH WITH REFLEX TO FT4     Other Visit Diagnoses         Vitamin B12 deficiency        Relevant Orders    VITAMIN B12      Vitamin D deficiency        Relevant Orders    VITAMIN D,25 HYDROXY (DEFICIENCY)      Skin cancer screening        Relevant Orders    Referral to Dermatology          FV 6 mo LABS     Please note that this dictation was created using voice recognition software. I have made every reasonable attempt to correct obvious errors, but I expect that there are errors of grammar and possibly content that I did not discover before finalizing the note.       [1]   Past Medical History:  Diagnosis Date    DCIS (ductal carcinoma in situ)     Hypertension     Prediabetes    [2]   Current Outpatient Medications Ordered in Epic   Medication Sig Dispense Refill    atenolol (TENORMIN) 100 MG Tab Take 1 Tablet by mouth every day. 90 Tablet 3    losartan (COZAAR) 100 MG Tab Take 1 Tablet by mouth every day. 90 Tablet 3    NIFEdipine (ADALAT CC) 30 MG CR tablet Take 1 Tablet by mouth every day. 90 Tablet 3    rosuvastatin (CRESTOR) 5 MG Tab Take 1 Tablet by mouth every evening. 90 Tablet 3    CALCIUM PO Take  by mouth.      Omega-3 Fatty Acids (FISH OIL) 1000 MG Cap capsule Take 1,000 mg by mouth every day.      cyanocobalamin (VITAMIN B12) 1000 MCG Tab Take 1,000 mcg by mouth every day.      Misc Natural Products (OSTEO BI-FLEX TRIPLE STRENGTH PO) Take  by mouth. (Patient not taking: Reported on 7/1/2025)       No current [facility]-ordered facility-administered medications on file.

## 2025-07-10 ENCOUNTER — HOSPITAL ENCOUNTER (OUTPATIENT)
Facility: MEDICAL CENTER | Age: 70
End: 2025-07-10
Payer: MEDICARE

## 2025-07-10 DIAGNOSIS — D05.12 DUCTAL CARCINOMA IN SITU (DCIS) OF LEFT BREAST: ICD-10-CM

## 2025-07-10 DIAGNOSIS — Z79.811 USE OF LETROZOLE (FEMARA): ICD-10-CM

## 2025-07-10 DIAGNOSIS — C50.412 MALIGNANT NEOPLASM OF UPPER-OUTER QUADRANT OF LEFT FEMALE BREAST, UNSPECIFIED ESTROGEN RECEPTOR STATUS (HCC): ICD-10-CM

## 2025-07-10 PROCEDURE — G0279 TOMOSYNTHESIS, MAMMO: HCPCS

## 2025-07-11 ENCOUNTER — PHARMACY VISIT (OUTPATIENT)
Dept: PHARMACY | Facility: MEDICAL CENTER | Age: 70
End: 2025-07-11
Payer: COMMERCIAL

## 2025-07-11 PROCEDURE — RXMED WILLOW AMBULATORY MEDICATION CHARGE: Performed by: INTERNAL MEDICINE

## 2025-07-14 ENCOUNTER — TELEPHONE (OUTPATIENT)
Dept: HEMATOLOGY ONCOLOGY | Facility: MEDICAL CENTER | Age: 70
End: 2025-07-14
Payer: MEDICARE

## 2025-07-14 NOTE — TELEPHONE ENCOUNTER
Second attempt at calling pt to have her r/s her up coming appt for 7/18/25 with tommy. Left detailed vm to have her call to r/s.

## 2025-07-18 ENCOUNTER — APPOINTMENT (OUTPATIENT)
Dept: HEMATOLOGY ONCOLOGY | Facility: MEDICAL CENTER | Age: 70
End: 2025-07-18
Payer: MEDICARE

## 2025-07-21 PROCEDURE — RXMED WILLOW AMBULATORY MEDICATION CHARGE: Performed by: INTERNAL MEDICINE

## 2025-07-22 ENCOUNTER — HOSPITAL ENCOUNTER (OUTPATIENT)
Dept: HEMATOLOGY ONCOLOGY | Facility: MEDICAL CENTER | Age: 70
End: 2025-07-22
Attending: NURSE PRACTITIONER
Payer: MEDICARE

## 2025-07-22 ENCOUNTER — PHARMACY VISIT (OUTPATIENT)
Dept: PHARMACY | Facility: MEDICAL CENTER | Age: 70
End: 2025-07-22
Payer: COMMERCIAL

## 2025-07-22 VITALS
HEIGHT: 62 IN | TEMPERATURE: 97.3 F | RESPIRATION RATE: 15 BRPM | DIASTOLIC BLOOD PRESSURE: 55 MMHG | BODY MASS INDEX: 23.73 KG/M2 | OXYGEN SATURATION: 97 % | WEIGHT: 128.97 LBS | SYSTOLIC BLOOD PRESSURE: 110 MMHG | HEART RATE: 59 BPM

## 2025-07-22 DIAGNOSIS — Z51.81 ENCOUNTER FOR MONITORING AROMATASE INHIBITOR THERAPY: ICD-10-CM

## 2025-07-22 DIAGNOSIS — Z79.811 ENCOUNTER FOR MONITORING AROMATASE INHIBITOR THERAPY: ICD-10-CM

## 2025-07-22 DIAGNOSIS — D05.12 DUCTAL CARCINOMA IN SITU (DCIS) OF LEFT BREAST: Primary | ICD-10-CM

## 2025-07-22 PROCEDURE — 99214 OFFICE O/P EST MOD 30 MIN: CPT | Performed by: NURSE PRACTITIONER

## 2025-07-22 PROCEDURE — 99212 OFFICE O/P EST SF 10 MIN: CPT | Performed by: NURSE PRACTITIONER

## 2025-07-22 ASSESSMENT — ENCOUNTER SYMPTOMS
CHILLS: 0
DIARRHEA: 0
VOMITING: 0
NAUSEA: 0
DIAPHORESIS: 0
PALPITATIONS: 0
DIZZINESS: 0
CONSTIPATION: 0
SHORTNESS OF BREATH: 0
COUGH: 0
FEVER: 0
MYALGIAS: 0
WEIGHT LOSS: 0
HEADACHES: 0

## 2025-07-22 ASSESSMENT — FIBROSIS 4 INDEX: FIB4 SCORE: 1.2

## 2025-07-22 ASSESSMENT — PAIN SCALES - GENERAL: PAINLEVEL_OUTOF10: NO PAIN

## 2025-07-22 NOTE — PROGRESS NOTES
Subjective     Leslie Tamez is a 70 y.o. 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     Oncology history of presenting illness:  Leslie Tamez is a 68 y.o. female with a remote history of right DCIS and a more recent history of left-sided DCIS treated with partial mastectomy and radiation in 2020. She saw Dr. Shoemaker in [place] who started her on letrozole at that time. She is tolerating it very well with no hot flashes night sweats or musculoskeletal symptoms. She had a recent bone density test that showed normal bone density. Her last mammogram was in March 2022. She has no problems with her breast now. Other than hypertension her health is good.      Interval histories:  Interval history 12/14/2023: She has done very well over the past 6 months. Routine mammogram screening in July 2023 was normal. She tolerates letrozole without any difficulty whatsoever. No new medical problems.      Interval history 06/13/24 (Efrensop, APRN):  Patient is doing very well.  She does note hot flash every once in a while but not bothersome.  She is tolerating letrozole with no difficulties.  She did recently undergo thyroid biopsy which was negative.    Interval history 1/14/2025 (Taty Coronado, AOCNP): Patient returns to clinic today for follow-up appointment.  She continues on her letrozole without any significant side effects.  Patient denies any changes in her breast, including any new lumps or masses, skin dimpling, puckering, or erythema, nipple retraction or discharge, or any breast tenderness/discomfort.   She states she continues to eat well, and gets plenty of exercise at the gym at least 5 days/week.   "She also takes supplemental calcium plus vitamin D.  No other complaints or concerns provided.     Interval history 07/22/25 (CAlsop, APRN):  Is doing very well overall.  She did take her last dose of letrozole on 5/13/2025.  She is feeling well.  She has noticed that her hot flashes have completely resolved.  She is being very active and works out in the gym.  She is also currently training for a 5K marathon this coming August.     Treatment history:  End of 2019: Partial mastectomy - [place], [place]   March 2020: XRT - [place]  April 2020: Initiation of Letrozole - Dr. Shoemaker in [place], [place]  05/13/25: Last dose of Letrozole      Allergies[1]  Medications Ordered Prior to Encounter[2]      Review of Systems   Constitutional:  Negative for chills, diaphoresis, fever, malaise/fatigue and weight loss.   Respiratory:  Negative for cough and shortness of breath (only every once in a while - very minimal).    Cardiovascular:  Negative for chest pain and palpitations.   Gastrointestinal:  Negative for constipation, diarrhea, nausea and vomiting.   Genitourinary:  Negative for dysuria.   Musculoskeletal:  Negative for myalgias.   Neurological:  Negative for dizziness and headaches.            Objective     /55 (BP Location: Right arm, Patient Position: Sitting, BP Cuff Size: Adult)   Pulse (!) 59   Temp 36.3 °C (97.3 °F) (Temporal)   Resp 15   Ht 1.575 m (5' 2.01\")   Wt 58.5 kg (128 lb 15.5 oz)   SpO2 97%   BMI 23.58 kg/m²      Physical Exam  Vitals reviewed.   Constitutional:       General: She is not in acute distress.     Appearance: Normal appearance. She is not diaphoretic.   HENT:      Head: Normocephalic and atraumatic.   Cardiovascular:      Rate and Rhythm: Normal rate and regular rhythm.      Heart sounds: Normal heart sounds. No murmur heard.     No friction rub. No gallop.   Pulmonary:      Effort: Pulmonary effort is normal. No respiratory distress.      Breath sounds: Normal breath " sounds. No wheezing.   Abdominal:      General: Bowel sounds are normal. There is no distension.      Palpations: Abdomen is soft.      Tenderness: There is no abdominal tenderness.   Musculoskeletal:         General: No swelling or tenderness. Normal range of motion.   Skin:     General: Skin is warm and dry.   Neurological:      Mental Status: She is alert and oriented to person, place, and time.   Psychiatric:         Mood and Affect: Mood normal.         Behavior: Behavior normal.          MA-DIAGNOSTIC MAMMO BILAT W/TOMOSYNTHESIS W/CAD  Result Date: 7/10/2025    7/10/2025 12:29 PM HISTORY/REASON FOR EXAM:  Patient with history of breast cancer on letrozole Breast conservation therapy in 2019. TECHNIQUE/EXAM DESCRIPTION AND NUMBER OF VIEWS: Bilateral tomosynthesis diagnostic mammography was performed with standard mammographic images generated from the data set. Images were reviewed and interpreted with CAD. COMPARISON:   Multiple priors dating back to 7/25/2019. FINDINGS: The breasts are heterogeneously dense, which may obscure small masses. There is stable architectural distortion with surgical clips in the left breast in keeping with postlumpectomy changes. A biopsy clip is again seen in the right breast. There is no dominant mass, defect or distortion or suspicious calcifications in either breast. The parenchymal pattern is unchanged.     No suspicious mammographic findings. Patient can return for screening mammogram in July 2026. These results were given to the patient at the time of visit. BI-RADS Category: R2 - CATEGORY 2: BENIGN FINDING(S) Ten to twenty percent of all cancers can be categorized as hereditary and the clinical and financial value of identifying patients and families at risk is well documented. If you have a personal or family history of breast, ovarian, fallopian tube, peritoneal or other cancer, please consult your physician regarding genetic counseling and testing. [facility]  [place] [facility] is offering no cost genetic screening for hereditary breast and ovarian cancer. For more information, discuss with your provider, sign-up through [employer], or visit [email or web address] to learn more.      DS-BONE DENSITY STUDY (DEXA)   10/21/24    IMPRESSION:  According to the World Health Organization classification, bone mineral density of this patient is normal in the spine and normal in the proximal left femur.         Assessment & Plan     1. Ductal carcinoma in situ (DCIS) of left breast        2. Encounter for monitoring aromatase inhibitor therapy                Impression:  1. History of bilateral DCIS status postlumpectomy and radiation therapy on chemoprophylaxis with letrozole since 2020. Completed last dose 5/13/25 to complete 5 years of treatment.   Annual mammogram completed on 7/10/2025 was negative for any concerning findings.  Next mammogram due in July 2026.  2. At risk for osteoporosis - Last DEXA scan completed 10/17/2022 which showed osteopenia. Patient will be due for next DEXA scan October 2024 - normal. Encouraged to continue vitamin D and calcium supplements. Next due October 2026.       Plan:   Patient has completed her 5 years of endocrine therapy with letrozole completing in May 2025.  At this time we will continue to follow patient annually, with her annual mammogram prior.    Patient verbalized understanding is in agreement with the plan.      Please note that this dictation was created using voice recognition software. I have made every reasonable attempt to correct obvious errors, but I expect that there are errors of grammar and possibly content that I did not discover before finalizing the note.               [1]   Allergies  Allergen Reactions    Sulfa Drugs Hives and Rash    Hydrocodone Vomiting and Nausea     Vicodin    Cephalexin     Codeine Unspecified     pt felt disoriented    Hydrocodone-Acetaminophen Vomiting    Septra [Sulfamethoxazole W-Trimethoprim]  Hives    Latex Rash     Rash  Other reaction(s): Rash  rash   [2]   Current Outpatient Medications on File Prior to Encounter   Medication Sig Dispense Refill    atenolol (TENORMIN) 100 MG Tab Take 1 Tablet by mouth every day. 90 Tablet 3    losartan (COZAAR) 100 MG Tab Take 1 Tablet by mouth every day. 90 Tablet 3    NIFEdipine (ADALAT CC) 30 MG CR tablet Take 1 Tablet by mouth every day. 90 Tablet 3    rosuvastatin (CRESTOR) 5 MG Tab Take 1 Tablet by mouth every evening. 90 Tablet 3    CALCIUM PO Take  by mouth.      Omega-3 Fatty Acids (FISH OIL) 1000 MG Cap capsule Take 1,000 mg by mouth every day.      cyanocobalamin (VITAMIN B12) 1000 MCG Tab Take 1,000 mcg by mouth every day.      Misc Natural Products (OSTEO BI-FLEX TRIPLE STRENGTH PO) Take  by mouth. (Patient not taking: Reported on 7/22/2025)       No current facility-administered medications on file prior to encounter.

## 2025-07-31 NOTE — ADDENDUM NOTE
Encounter addended by: Genesis Junior on: 7/31/2025 2:30 PM   Actions taken: Charge Capture section accepted